# Patient Record
Sex: FEMALE | Race: WHITE | Employment: FULL TIME | URBAN - METROPOLITAN AREA
[De-identification: names, ages, dates, MRNs, and addresses within clinical notes are randomized per-mention and may not be internally consistent; named-entity substitution may affect disease eponyms.]

---

## 2017-01-09 ENCOUNTER — APPOINTMENT (OUTPATIENT)
Dept: HEMATOLOGY/ONCOLOGY | Age: 52
End: 2017-01-09
Attending: INTERNAL MEDICINE
Payer: COMMERCIAL

## 2017-01-11 ENCOUNTER — OFFICE VISIT (OUTPATIENT)
Dept: HEMATOLOGY/ONCOLOGY | Age: 52
End: 2017-01-11
Attending: INTERNAL MEDICINE
Payer: COMMERCIAL

## 2017-01-11 VITALS
DIASTOLIC BLOOD PRESSURE: 88 MMHG | HEART RATE: 97 BPM | TEMPERATURE: 99 F | RESPIRATION RATE: 18 BRPM | SYSTOLIC BLOOD PRESSURE: 133 MMHG

## 2017-01-11 DIAGNOSIS — L40.50 PSORIATIC ARTHROPATHY (HCC): Primary | ICD-10-CM

## 2017-01-11 PROCEDURE — 96413 CHEMO IV INFUSION 1 HR: CPT

## 2017-01-11 PROCEDURE — 96415 CHEMO IV INFUSION ADDL HR: CPT

## 2017-01-11 NOTE — PROGRESS NOTES
Education Record    Learner:  Patient    Disease / Diagnosis: psoriatic arthritis    Barriers / Limitations:  None   Comments:    Method:  Discussion   Comments:    General Topics:  Medication, Side effects and symptom management and Plan of care reviewed

## 2017-02-06 ENCOUNTER — OFFICE VISIT (OUTPATIENT)
Dept: HEMATOLOGY/ONCOLOGY | Age: 52
End: 2017-02-06
Attending: INTERNAL MEDICINE
Payer: COMMERCIAL

## 2017-02-06 VITALS
SYSTOLIC BLOOD PRESSURE: 131 MMHG | TEMPERATURE: 99 F | HEART RATE: 102 BPM | DIASTOLIC BLOOD PRESSURE: 81 MMHG | RESPIRATION RATE: 18 BRPM | BODY MASS INDEX: 40 KG/M2 | WEIGHT: 250 LBS

## 2017-02-06 DIAGNOSIS — L40.50 PSORIATIC ARTHROPATHY (HCC): Primary | ICD-10-CM

## 2017-02-06 PROCEDURE — 96413 CHEMO IV INFUSION 1 HR: CPT

## 2017-02-06 PROCEDURE — 96415 CHEMO IV INFUSION ADDL HR: CPT

## 2017-02-24 NOTE — TELEPHONE ENCOUNTER
From: Gordon Rahman  To: King Ni DO  Sent: 2/24/2017 11:01 AM CST  Subject: Prescription Question    Tariq Christie:  Would you be willing to send in temporary Rx for the Venlafaxine 75mg and 150mg strengths and Bupropion 300mg.  I am seeing you on March 6th

## 2017-02-24 NOTE — TELEPHONE ENCOUNTER
Pt is requesting bridge prescription to her appointment, pended if you agree    Future Appointments  Date Time Provider Dyan Gutiérrez   3/6/2017 2:00 PM PF TX RN1 PF CHEMO I Frank   3/6/2017 7:00 PM Robert Weinberg DO EMG 30 EMG [place]   3/16/2017 2

## 2017-03-06 ENCOUNTER — OFFICE VISIT (OUTPATIENT)
Dept: FAMILY MEDICINE CLINIC | Facility: CLINIC | Age: 52
End: 2017-03-06

## 2017-03-06 ENCOUNTER — OFFICE VISIT (OUTPATIENT)
Dept: HEMATOLOGY/ONCOLOGY | Age: 52
End: 2017-03-06
Attending: INTERNAL MEDICINE
Payer: COMMERCIAL

## 2017-03-06 VITALS
RESPIRATION RATE: 16 BRPM | SYSTOLIC BLOOD PRESSURE: 118 MMHG | BODY MASS INDEX: 40 KG/M2 | HEART RATE: 80 BPM | DIASTOLIC BLOOD PRESSURE: 78 MMHG | TEMPERATURE: 98 F | OXYGEN SATURATION: 98 % | WEIGHT: 249.38 LBS

## 2017-03-06 VITALS — WEIGHT: 247 LBS | BODY MASS INDEX: 40 KG/M2

## 2017-03-06 DIAGNOSIS — L40.50 PSORIATIC ARTHROPATHY (HCC): Primary | ICD-10-CM

## 2017-03-06 DIAGNOSIS — Z86.2 HISTORY OF ANEMIA: ICD-10-CM

## 2017-03-06 DIAGNOSIS — M54.50 BILATERAL LOW BACK PAIN WITHOUT SCIATICA, UNSPECIFIED CHRONICITY: ICD-10-CM

## 2017-03-06 DIAGNOSIS — E28.2 POLYCYSTIC OVARIES: ICD-10-CM

## 2017-03-06 DIAGNOSIS — R19.7 DIARRHEA, UNSPECIFIED TYPE: ICD-10-CM

## 2017-03-06 DIAGNOSIS — F41.9 ANXIETY: ICD-10-CM

## 2017-03-06 DIAGNOSIS — G47.00 INSOMNIA, UNSPECIFIED TYPE: ICD-10-CM

## 2017-03-06 DIAGNOSIS — I10 ESSENTIAL HYPERTENSION WITH GOAL BLOOD PRESSURE LESS THAN 140/90: ICD-10-CM

## 2017-03-06 DIAGNOSIS — K58.0 IRRITABLE BOWEL SYNDROME WITH DIARRHEA: Primary | ICD-10-CM

## 2017-03-06 DIAGNOSIS — K21.9 GASTROESOPHAGEAL REFLUX DISEASE WITHOUT ESOPHAGITIS: ICD-10-CM

## 2017-03-06 PROCEDURE — 99214 OFFICE O/P EST MOD 30 MIN: CPT | Performed by: FAMILY MEDICINE

## 2017-03-06 PROCEDURE — 96413 CHEMO IV INFUSION 1 HR: CPT

## 2017-03-06 PROCEDURE — 96415 CHEMO IV INFUSION ADDL HR: CPT

## 2017-03-06 RX ORDER — VENLAFAXINE HYDROCHLORIDE 150 MG/1
150 CAPSULE, EXTENDED RELEASE ORAL
Qty: 30 CAPSULE | Refills: 0 | Status: SHIPPED | OUTPATIENT
Start: 2017-03-06 | End: 2017-04-10

## 2017-03-06 RX ORDER — VENLAFAXINE HYDROCHLORIDE 75 MG/1
75 CAPSULE, EXTENDED RELEASE ORAL
Qty: 30 CAPSULE | Refills: 0 | Status: SHIPPED | OUTPATIENT
Start: 2017-03-06 | End: 2017-04-10

## 2017-03-06 RX ORDER — TRAZODONE HYDROCHLORIDE 50 MG/1
50 TABLET ORAL NIGHTLY PRN
Qty: 90 TABLET | Refills: 1 | Status: SHIPPED | OUTPATIENT
Start: 2017-03-06 | End: 2017-09-06

## 2017-03-06 RX ORDER — HYOSCYAMINE SULFATE 0.125 MG
0.12 TABLET ORAL EVERY 6 HOURS PRN
Qty: 40 TABLET | Refills: 3 | Status: SHIPPED | OUTPATIENT
Start: 2017-03-06 | End: 2017-04-05

## 2017-03-06 RX ORDER — PANTOPRAZOLE SODIUM 40 MG/1
40 TABLET, DELAYED RELEASE ORAL
Qty: 30 TABLET | Refills: 6 | Status: SHIPPED | OUTPATIENT
Start: 2017-03-06 | End: 2017-06-09

## 2017-03-06 RX ORDER — BACLOFEN 20 MG/1
TABLET ORAL
Qty: 30 TABLET | Refills: 1 | Status: SHIPPED | OUTPATIENT
Start: 2017-03-06 | End: 2017-04-10

## 2017-03-06 RX ORDER — BUPROPION HYDROCHLORIDE 300 MG/1
TABLET ORAL
Qty: 30 TABLET | Refills: 0 | Status: SHIPPED | OUTPATIENT
Start: 2017-03-06 | End: 2017-04-25

## 2017-03-06 RX ORDER — LISINOPRIL AND HYDROCHLOROTHIAZIDE 25; 20 MG/1; MG/1
1 TABLET ORAL
Qty: 90 TABLET | Refills: 1 | Status: SHIPPED | OUTPATIENT
Start: 2017-03-06 | End: 2017-06-09

## 2017-03-06 NOTE — PROGRESS NOTES
Education Record    Learner:  Patient    Disease / Diagnosis:ra    Barriers / Limitations:  None   Comments:    Method:  Reinforcement   Comments:    General Topics:  Side effects and symptom management and Plan of care reviewed   Comments:    Outcome:   Sh

## 2017-03-07 NOTE — PROGRESS NOTES
CHIEF COMPLAINT: Patient presents with:   Follow - Up: medication, bowel urgency         HPI:     Jessica Zepeda is a 45year old female presents for med refill and complains of diarrhea and bowel urgency at least 2 times a week increasing recently from [facility] lower extremities. Uses baclofen most nights to help with spasm and tightness in the back. Back pain is unchanged. Patient presents for recheck of hypertension.  Pt has been taking medications as instructed, no medication side effects, home BP monitori BuPROPion HCl ER, XL, 300 MG Oral Tablet 24 Hr Take 1 tablet (300 mg total) by mouth once daily.  Disp: 30 tablet Rfl: 0   baclofen 20 MG Oral Tab TAKE ONE TABLET BY MOUTH EVERY EVENING Disp: 30 tablet Rfl: 1   TraZODone HCl 50 MG Oral Tab Take 1 tablet ( Disp: 180 tablet Rfl: 1   [DISCONTINUED] Lisinopril-Hydrochlorothiazide 20-25 MG Oral Tab Take 1 tablet by mouth once daily.  Disp: 90 tablet Rfl: 1   [DISCONTINUED] TraZODone HCl 50 MG Oral Tab Take 1 tablet (50 mg total) by mouth nightly as needed [facility] Value: <0.1(mg/dL)* Date: 12/14/2016   Sed Rate Value: 18(mm/Hr)  Date: 12/14/2016   C-Reactive Protein Value: <0.29(mg/dL)  Date: 12/14/2016                                           WBC Value: 5.9(x10(3) uL)  Date: 12/14/2016   RBC Value: 4.49(x10 sciatica, unspecified chronicity  stable  - baclofen 20 MG Oral Tab; TAKE ONE TABLET BY MOUTH EVERY EVENING  Dispense: 30 tablet; Refill: 1    4. Insomnia, unspecified type  Sleep hygiene  - TraZODone HCl 50 MG Oral Tab;  Take 1 tablet (50 mg total) by mout 90 tablet 1      Sig: Take 1 tablet (50 mg total) by mouth nightly as needed for Sleep. Venlafaxine HCl ER 75 MG Oral Capsule SR 24 Hr 30 capsule 0      Sig: Take 1 capsule (75 mg total) by mouth once daily.       Venlafaxine HCl  MG Oral Capsule labs.

## 2017-03-07 NOTE — PATIENT INSTRUCTIONS
Please follow-up with:    Dr. Mesha Cheng MD  [facility]   [address]  [address], [address] [place] [address]  Phone: [phone]  Fax: [phone].     Back Acute back pain usually gets better in 1 to 2 weeks. Back pain related to disk disease, arthritis in the spinal joints or spinal stenosis (narrowing of the spinal canal) can become chronic and last for months or years.   Unless you had a physical injury (fo · Therapeutic massage can help relax the back muscles without stretching them.   · Be aware of safe lifting methods and do not lift anything without stretching first.  Medicines  Talk to your doctor before using medicine, especially if you have other medica People who have irritable bowel syndrome (IBS) have digestive tracts that react abnormally to certain substances or to stress. This leads to symptoms like cramps, gas, bloating, pain, constipation, and diarrhea.  Sometimes called “spastic colon,” IBS is a c Recommendations include:  · Certain medicines may help regulate the working of your digestive tract. Your healthcare provider may prescribe one or more for you. · Medicine can’t cure IBS, but it may help manage the symptoms.   · Because some medicines may If stress or anxiety makes your IBS symptoms worse, learning how to manage stress may help you feel better. Try these tips:  · Identify the causes of stress in your life. · Learn new ways to cope with them.   · Regular exercise is a great way to relieve st · Try cutting out dairy products. Sometimes this helps. · Try cutting out foods that are high in fat and fatty meats. · You can control bloating or passing excess gas.  Be careful with “gassy” vegetables and fruits like beans, cabbage, broccoli, and cauli · You have severe diarrhea  · You have blood (red or black color) or mucus in your stool  · You feel very weak or dizzy, faint, or have extreme thirst  · You have a fever of 100.4ºF (38[facility]) or higher, or as directed by your healthcare provider  Date Last

## 2017-03-08 ENCOUNTER — HOSPITAL ENCOUNTER (OUTPATIENT)
Dept: ULTRASOUND IMAGING | Facility: HOSPITAL | Age: 52
Discharge: HOME | End: 2017-03-08
Attending: INTERNAL MEDICINE
Payer: COMMERCIAL

## 2017-03-08 ENCOUNTER — APPOINTMENT (OUTPATIENT)
Dept: HEMATOLOGY/ONCOLOGY | Age: 52
End: 2017-03-08
Attending: INTERNAL MEDICINE
Payer: COMMERCIAL

## 2017-03-08 DIAGNOSIS — Z79.899 LONG-TERM USE OF IMMUNOSUPPRESSANT MEDICATION: ICD-10-CM

## 2017-03-08 DIAGNOSIS — L40.50 ARTHRITIS WITH PSORIASIS (HCC): ICD-10-CM

## 2017-03-08 DIAGNOSIS — L40.50 PSORIATIC ARTHRITIS (HCC): ICD-10-CM

## 2017-03-08 DIAGNOSIS — M79.89 SWELLING OF THUMB, LEFT: ICD-10-CM

## 2017-03-08 PROCEDURE — 85025 COMPLETE CBC W/AUTO DIFF WBC: CPT

## 2017-03-08 PROCEDURE — 84439 ASSAY OF FREE THYROXINE: CPT

## 2017-03-08 PROCEDURE — 36415 COLL VENOUS BLD VENIPUNCTURE: CPT

## 2017-03-08 PROCEDURE — 86140 C-REACTIVE PROTEIN: CPT

## 2017-03-08 PROCEDURE — 76881 US COMPL JOINT R-T W/IMG: CPT

## 2017-03-08 PROCEDURE — 80053 COMPREHEN METABOLIC PANEL: CPT

## 2017-03-08 PROCEDURE — 85652 RBC SED RATE AUTOMATED: CPT

## 2017-03-08 PROCEDURE — 84443 ASSAY THYROID STIM HORMONE: CPT

## 2017-03-08 PROCEDURE — 86480 TB TEST CELL IMMUN MEASURE: CPT

## 2017-03-16 ENCOUNTER — TELEPHONE (OUTPATIENT)
Dept: FAMILY MEDICINE CLINIC | Facility: CLINIC | Age: 52
End: 2017-03-16

## 2017-03-16 DIAGNOSIS — E89.0 POST-SURGICAL HYPOTHYROIDISM: ICD-10-CM

## 2017-03-16 DIAGNOSIS — E03.9 ACQUIRED HYPOTHYROIDISM: Primary | ICD-10-CM

## 2017-03-16 RX ORDER — LEVOTHYROXINE SODIUM 112 UG/1
112 CAPSULE ORAL
Qty: 30 CAPSULE | Refills: 2 | Status: SHIPPED | OUTPATIENT
Start: 2017-03-16 | End: 2017-04-19

## 2017-03-16 NOTE — TELEPHONE ENCOUNTER
Patient with hypothyroidism and has hyperthyroidism having recent IBS symptoms and diarrhea and it could be related to her excessive thyroid medication.  New RX faxed please inform

## 2017-03-16 NOTE — TELEPHONE ENCOUNTER
Left message for pt with lab results. Office # and hours given for any questions      Notes Recorded by Mary Bass DO on 3/15/2017 at 6:50 PM  iatrogenic hyperthyroidism- hx of thyroid cancer/s/p thyroidectomy on levothyroxine 125mcg.   Will forward th

## 2017-04-03 ENCOUNTER — APPOINTMENT (OUTPATIENT)
Dept: HEMATOLOGY/ONCOLOGY | Age: 52
End: 2017-04-03
Attending: INTERNAL MEDICINE
Payer: COMMERCIAL

## 2017-04-03 ENCOUNTER — OFFICE VISIT (OUTPATIENT)
Dept: HEMATOLOGY/ONCOLOGY | Age: 52
End: 2017-04-03
Attending: INTERNAL MEDICINE
Payer: COMMERCIAL

## 2017-04-03 VITALS
HEIGHT: 65.98 IN | BODY MASS INDEX: 39.7 KG/M2 | TEMPERATURE: 98 F | WEIGHT: 247 LBS | SYSTOLIC BLOOD PRESSURE: 117 MMHG | DIASTOLIC BLOOD PRESSURE: 74 MMHG | RESPIRATION RATE: 16 BRPM | HEART RATE: 80 BPM

## 2017-04-03 DIAGNOSIS — L40.50 PSORIATIC ARTHROPATHY (HCC): Primary | ICD-10-CM

## 2017-04-03 PROCEDURE — 96415 CHEMO IV INFUSION ADDL HR: CPT

## 2017-04-03 PROCEDURE — 96413 CHEMO IV INFUSION 1 HR: CPT

## 2017-04-03 NOTE — PROGRESS NOTES
Education Record    Learner:  Patient    Disease / [place] arthritis    Barriers / Limitations:  None   Comments:    Method:  Discussion   Comments:    General Topics:  Medication, Side effects and symptom management and Plan of care reviewed

## 2017-04-10 ENCOUNTER — OFFICE VISIT (OUTPATIENT)
Dept: FAMILY MEDICINE CLINIC | Facility: CLINIC | Age: 52
End: 2017-04-10

## 2017-04-10 VITALS
RESPIRATION RATE: 16 BRPM | BODY MASS INDEX: 40 KG/M2 | OXYGEN SATURATION: 98 % | WEIGHT: 249 LBS | SYSTOLIC BLOOD PRESSURE: 122 MMHG | HEART RATE: 89 BPM | TEMPERATURE: 98 F | DIASTOLIC BLOOD PRESSURE: 76 MMHG

## 2017-04-10 DIAGNOSIS — M54.50 BILATERAL LOW BACK PAIN WITHOUT SCIATICA, UNSPECIFIED CHRONICITY: ICD-10-CM

## 2017-04-10 DIAGNOSIS — R10.9 ABDOMINAL CRAMPING: ICD-10-CM

## 2017-04-10 DIAGNOSIS — K58.0 IRRITABLE BOWEL SYNDROME WITH DIARRHEA: ICD-10-CM

## 2017-04-10 DIAGNOSIS — F33.42 RECURRENT MAJOR DEPRESSIVE DISORDER, IN FULL REMISSION (HCC): ICD-10-CM

## 2017-04-10 DIAGNOSIS — R11.15 INTRACTABLE CYCLICAL VOMITING WITH NAUSEA: Primary | ICD-10-CM

## 2017-04-10 DIAGNOSIS — R13.10 DYSPHAGIA, UNSPECIFIED(787.20): ICD-10-CM

## 2017-04-10 DIAGNOSIS — F41.9 ANXIETY: ICD-10-CM

## 2017-04-10 DIAGNOSIS — E89.0 POSTOPERATIVE HYPOTHYROIDISM: ICD-10-CM

## 2017-04-10 PROCEDURE — 99214 OFFICE O/P EST MOD 30 MIN: CPT | Performed by: FAMILY MEDICINE

## 2017-04-10 RX ORDER — BACLOFEN 20 MG/1
TABLET ORAL
Qty: 90 TABLET | Refills: 1 | Status: SHIPPED | OUTPATIENT
Start: 2017-04-10 | End: 2017-07-21

## 2017-04-10 RX ORDER — VENLAFAXINE HYDROCHLORIDE 150 MG/1
150 CAPSULE, EXTENDED RELEASE ORAL
Qty: 90 CAPSULE | Refills: 0 | Status: SHIPPED | OUTPATIENT
Start: 2017-04-10 | End: 2017-07-21

## 2017-04-10 RX ORDER — VENLAFAXINE HYDROCHLORIDE 75 MG/1
75 CAPSULE, EXTENDED RELEASE ORAL
Qty: 90 CAPSULE | Refills: 0 | Status: SHIPPED | OUTPATIENT
Start: 2017-04-10 | End: 2017-07-21

## 2017-04-11 NOTE — PROGRESS NOTES
CHIEF COMPLAINT: Patient presents with:   Follow - Up: labs        HPI:     Ernesta Fleischer is a 45year old female presents for labs/thyroid and followup of diarrhea and bowel urgency at least 2 times a week increasing recently from occasional in the past ye pregnancy 1998, 2003     x2   • Psoriatic arthritis ([facility] [address]    • Thyroid cancer [facility]) 1994     thyroidectomy          Past Surgical History    KNEE REPLACEMENT SURGERY      ORAL SURGERY PROCEDURE  1/1/1989    Comment Tooth Extraction, \" wisdom teeth x 4\" tablet Rfl: 1   MetFORMIN HCl 850 MG Oral Tab TAKE ONE TABLET BY MOUTH TWICE DAILY WITH MEALS Disp: 180 tablet Rfl: 1   BuPROPion HCl ER, XL, 300 MG Oral Tablet 24 Hr Take 1 tablet (300 mg total) by mouth once daily.  Disp: 30 tablet Rfl: 0   TraZODone HCl 03/30/2017 (Exact Date)  Vital signs reviewed. Appears stated age, well groomed. Physical Exam  General: Well-nourished, well hydrated. No acute distress. No pallor. No tachypnea. Non toxic. HEENT: normocephaly, atraumatic.   Sclera clear and non icteric b Refill: 0  - Venlafaxine HCl  MG Oral Capsule SR 24 Hr; Take 1 capsule (150 mg total) by mouth once daily. Dispense: 90 capsule; Refill: 0    6. Intractable cyclical vomiting with nausea  5.  Dysphagia, unspecified  Etiology unclear   Uncontrolled si Esophageal reflux     Abnormal echocardiogram     SOB (shortness of breath)     Vegetarian diet     Hallux valgus, acquired, bilateral     Psoriasis of nail     Metatarsalgia of both feet     Irritable bowel syndrome with diarrhea     History of anemia

## 2017-04-17 ENCOUNTER — NURSE ONLY (OUTPATIENT)
Dept: HEMATOLOGY/ONCOLOGY | Age: 52
End: 2017-04-17
Attending: INTERNAL MEDICINE
Payer: COMMERCIAL

## 2017-04-17 DIAGNOSIS — M25.676: ICD-10-CM

## 2017-04-17 DIAGNOSIS — R20.2 TINGLING: ICD-10-CM

## 2017-04-17 DIAGNOSIS — L40.50 PSORIATIC ARTHRITIS (HCC): ICD-10-CM

## 2017-04-17 DIAGNOSIS — Z79.899 LONG-TERM USE OF IMMUNOSUPPRESSANT MEDICATION: ICD-10-CM

## 2017-04-17 DIAGNOSIS — E89.0 POST-SURGICAL HYPOTHYROIDISM: ICD-10-CM

## 2017-04-17 DIAGNOSIS — R74.8 ALKALINE PHOSPHATASE ELEVATION: ICD-10-CM

## 2017-04-17 PROCEDURE — 84439 ASSAY OF FREE THYROXINE: CPT

## 2017-04-17 PROCEDURE — 84443 ASSAY THYROID STIM HORMONE: CPT

## 2017-04-17 PROCEDURE — 85652 RBC SED RATE AUTOMATED: CPT

## 2017-04-17 PROCEDURE — 82565 ASSAY OF CREATININE: CPT

## 2017-04-17 PROCEDURE — 84080 ASSAY ALKALINE PHOSPHATASES: CPT

## 2017-04-17 PROCEDURE — 82977 ASSAY OF GGT: CPT

## 2017-04-17 PROCEDURE — 86140 C-REACTIVE PROTEIN: CPT

## 2017-04-17 PROCEDURE — 85025 COMPLETE CBC W/AUTO DIFF WBC: CPT

## 2017-04-17 PROCEDURE — 84520 ASSAY OF UREA NITROGEN: CPT

## 2017-04-17 PROCEDURE — 80076 HEPATIC FUNCTION PANEL: CPT

## 2017-04-17 PROCEDURE — 36415 COLL VENOUS BLD VENIPUNCTURE: CPT

## 2017-04-18 ENCOUNTER — PATIENT MESSAGE (OUTPATIENT)
Dept: FAMILY MEDICINE CLINIC | Facility: CLINIC | Age: 52
End: 2017-04-18

## 2017-04-18 DIAGNOSIS — E89.0 POST-SURGICAL HYPOTHYROIDISM: Primary | ICD-10-CM

## 2017-04-19 RX ORDER — LEVOTHYROXINE SODIUM 112 UG/1
112 CAPSULE ORAL
Qty: 90 CAPSULE | Refills: 0 | Status: SHIPPED | OUTPATIENT
Start: 2017-04-19 | End: 2017-05-19

## 2017-04-19 NOTE — TELEPHONE ENCOUNTER
From: Gaurav Shearer  To: Trupti Bright DO  Sent: 4/18/2017 11:10 AM CDT  Subject: Test Results Question    My TSH/T4 labs are completed.  Please check w/ MD what dose levothyroxine she wants me on and send to [address] [place] [address] supply if po

## 2017-04-19 NOTE — TELEPHONE ENCOUNTER
Pt requesting refill on levothyroxine, protocol passed, refill approved    LOV 4/10/17    LF 3/16/17 #30 w/2    Future Appointments  Date Time Provider Dyan Gutiérrez   6/5/2017 9:20 AM DO NADIR Baugh NPV   10/16/2017 6:20 PM [facility]

## 2017-04-24 NOTE — TELEPHONE ENCOUNTER
Pt requesting refill on Buproprion, no protocol, unable to approve    LOV 4/10/17    LF 3/6/17 #30    Future Appointments  Date Time Provider Dyan Gutiérrez   5/2/2017 4:45 PM Grace Kuo DPM [address]   6/5/2017 9:20 AM Dante Pettit,

## 2017-04-24 NOTE — TELEPHONE ENCOUNTER
From: Aleksandra Rodríguez  To:  Mayelin Valentin DO  Sent: 4/21/2017 5:12 PM CDT  Subject: Medication Renewal Request    Original authorizing provider: DO Augustina Booth would like a refill of the following medications:  BuPROPion HCl ER, XL, 300

## 2017-04-25 RX ORDER — BUPROPION HYDROCHLORIDE 300 MG/1
TABLET ORAL
Qty: 90 TABLET | Refills: 1 | Status: SHIPPED | OUTPATIENT
Start: 2017-04-25 | End: 2017-07-21

## 2017-05-01 ENCOUNTER — OFFICE VISIT (OUTPATIENT)
Dept: HEMATOLOGY/ONCOLOGY | Age: 52
End: 2017-05-01
Attending: INTERNAL MEDICINE
Payer: COMMERCIAL

## 2017-05-01 VITALS — WEIGHT: 247 LBS | BODY MASS INDEX: 40 KG/M2

## 2017-05-01 DIAGNOSIS — L40.50 PSORIATIC ARTHROPATHY (HCC): Primary | ICD-10-CM

## 2017-05-01 PROCEDURE — 96413 CHEMO IV INFUSION 1 HR: CPT

## 2017-05-01 PROCEDURE — 96415 CHEMO IV INFUSION ADDL HR: CPT

## 2017-05-01 NOTE — PROGRESS NOTES
Education Record    Learner:  Patient    Disease / [address] arthritis    Barriers / Limitations:  None   Comments:    Method:  Reinforcement   Comments:    General Topics:  Side effects and symptom management and Plan of care reviewed   Comments

## 2017-05-22 ENCOUNTER — PATIENT MESSAGE (OUTPATIENT)
Dept: FAMILY MEDICINE CLINIC | Facility: CLINIC | Age: 52
End: 2017-05-22

## 2017-05-22 NOTE — TELEPHONE ENCOUNTER
From: Suzanne Stoner  To: Christos Cohn DO  Sent: 5/22/2017 5:25 PM CDT  Subject: Other    Chelsea Becket:  Good news! ! I figured out the mystery. My GI issues were most probably caused by 5-HTP. Sherin Wallace got me a bottle from his work.  I ran out and the issues are much

## 2017-05-31 ENCOUNTER — OFFICE VISIT (OUTPATIENT)
Dept: HEMATOLOGY/ONCOLOGY | Age: 52
End: 2017-05-31
Attending: INTERNAL MEDICINE
Payer: COMMERCIAL

## 2017-05-31 DIAGNOSIS — L40.50 PSORIATIC ARTHROPATHY (HCC): Primary | ICD-10-CM

## 2017-05-31 PROCEDURE — 96365 THER/PROPH/DIAG IV INF INIT: CPT

## 2017-05-31 PROCEDURE — 96415 CHEMO IV INFUSION ADDL HR: CPT

## 2017-05-31 PROCEDURE — 96366 THER/PROPH/DIAG IV INF ADDON: CPT

## 2017-05-31 PROCEDURE — 96413 CHEMO IV INFUSION 1 HR: CPT

## 2017-05-31 NOTE — PROGRESS NOTES
Instructed pt to have Dr. Deanne Degroot office to fax new Remicade order after Monday's MD f/u appt. Pt verbalized understanding.

## 2017-06-07 ENCOUNTER — PATIENT MESSAGE (OUTPATIENT)
Dept: FAMILY MEDICINE CLINIC | Facility: CLINIC | Age: 52
End: 2017-06-07

## 2017-06-07 RX ORDER — ALPRAZOLAM 0.25 MG/1
TABLET ORAL
Qty: 30 TABLET | Refills: 0 | Status: SHIPPED | OUTPATIENT
Start: 2017-06-07 | End: 2017-09-17

## 2017-06-07 NOTE — TELEPHONE ENCOUNTER
Please call in.order signed. RX alprazolam given 1 tab every 6 hours as needed. #30. Please inform. Sent email to pt.

## 2017-06-07 NOTE — TELEPHONE ENCOUNTER
From: Dede Rinne  To: Gem Garcia DO  Sent: 6/7/2017 11:39 AM CDT  Subject: Prescription Question    Hi Ria:  I wonder if you would be willing to write an Rx for alprazolam for me to have on hand.  My dad passed away this past Friday - he was suffer

## 2017-06-08 NOTE — TELEPHONE ENCOUNTER
Pt returned call, requested RX sent to [facility].  Rx called to pharmacy, [address]

## 2017-06-09 RX ORDER — LEVOTHYROXINE SODIUM 0.12 MG/1
TABLET ORAL
Qty: 90 TABLET | OUTPATIENT
Start: 2017-06-09

## 2017-06-09 RX ORDER — LISINOPRIL AND HYDROCHLOROTHIAZIDE 25; 20 MG/1; MG/1
TABLET ORAL
Qty: 90 TABLET | Refills: 0 | Status: SHIPPED | OUTPATIENT
Start: 2017-06-09 | End: 2017-10-16

## 2017-06-09 NOTE — TELEPHONE ENCOUNTER
Refill request for lisinopril. Protocol passed, refill approved. Refill for pantoprazole. No protocol, unable to approve. LOV 4/10/2017   3/6/2017 #30 w/ 6. Please advise.

## 2017-06-12 RX ORDER — LEVOTHYROXINE SODIUM 0.12 MG/1
TABLET ORAL
Qty: 90 TABLET | OUTPATIENT
Start: 2017-06-12

## 2017-06-12 RX ORDER — PANTOPRAZOLE SODIUM 40 MG/1
TABLET, DELAYED RELEASE ORAL
Qty: 30 TABLET | OUTPATIENT
Start: 2017-06-12

## 2017-06-12 RX ORDER — PANTOPRAZOLE SODIUM 40 MG/1
TABLET, DELAYED RELEASE ORAL
Qty: 30 TABLET | Refills: 3 | Status: SHIPPED | OUTPATIENT
Start: 2017-06-12 | End: 2017-09-06

## 2017-06-28 ENCOUNTER — OFFICE VISIT (OUTPATIENT)
Dept: HEMATOLOGY/ONCOLOGY | Age: 52
End: 2017-06-28
Attending: INTERNAL MEDICINE
Payer: COMMERCIAL

## 2017-06-28 VITALS
TEMPERATURE: 98 F | BODY MASS INDEX: 39 KG/M2 | SYSTOLIC BLOOD PRESSURE: 128 MMHG | WEIGHT: 244 LBS | DIASTOLIC BLOOD PRESSURE: 83 MMHG | HEART RATE: 90 BPM | RESPIRATION RATE: 16 BRPM

## 2017-06-28 DIAGNOSIS — L40.50 PSORIATIC ARTHROPATHY (HCC): Primary | ICD-10-CM

## 2017-06-28 PROCEDURE — 96413 CHEMO IV INFUSION 1 HR: CPT

## 2017-06-28 PROCEDURE — 96415 CHEMO IV INFUSION ADDL HR: CPT

## 2017-06-28 NOTE — PROGRESS NOTES
Education Record    Learner:  Patient    Disease / Diagnosis:arthritis    Barriers / Limitations:  None    Method:  Brief focused, printed material and  reinforcement    General Topics:  Plan of care reviewed    Outcome:  Shows understanding

## 2017-07-18 ENCOUNTER — NURSE ONLY (OUTPATIENT)
Dept: HEMATOLOGY/ONCOLOGY | Age: 52
End: 2017-07-18
Attending: INTERNAL MEDICINE
Payer: COMMERCIAL

## 2017-07-18 DIAGNOSIS — Z79.899 LONG-TERM USE OF HIGH-RISK MEDICATION: ICD-10-CM

## 2017-07-18 LAB
ALBUMIN SERPL-MCNC: 3.7 G/DL (ref 3.5–4.8)
ALP LIVER SERPL-CCNC: 115 U/L (ref 41–108)
ALT SERPL-CCNC: 23 U/L (ref 14–54)
AST SERPL-CCNC: 18 U/L (ref 15–41)
BASOPHILS # BLD AUTO: 0.1 X10(3) UL (ref 0–0.1)
BASOPHILS NFR BLD AUTO: 1.5 %
BILIRUB DIRECT SERPL-MCNC: <0.1 MG/DL (ref 0.1–0.5)
BILIRUB SERPL-MCNC: 0.3 MG/DL (ref 0.1–2)
BUN BLD-MCNC: 7 MG/DL (ref 8–20)
C-REACTIVE PROTEIN: <0.29 MG/DL (ref ?–1)
CREAT BLD-MCNC: 0.81 MG/DL (ref 0.55–1.02)
EOSINOPHIL # BLD AUTO: 0.13 X10(3) UL (ref 0–0.3)
EOSINOPHIL NFR BLD AUTO: 2 %
ERYTHROCYTE [DISTWIDTH] IN BLOOD BY AUTOMATED COUNT: 16.1 % (ref 11.5–16)
HCT VFR BLD AUTO: 37.8 % (ref 34–50)
HGB BLD-MCNC: 12.2 G/DL (ref 12–16)
IMMATURE GRANULOCYTE COUNT: 0.03 X10(3) UL (ref 0–1)
IMMATURE GRANULOCYTE RATIO %: 0.5 %
LYMPHOCYTES # BLD AUTO: 2.14 X10(3) UL (ref 0.9–4)
LYMPHOCYTES NFR BLD AUTO: 32.2 %
M PROTEIN MFR SERPL ELPH: 7 G/DL (ref 6.1–8.3)
MCH RBC QN AUTO: 29.4 PG (ref 27–33.2)
MCHC RBC AUTO-ENTMCNC: 32.3 G/DL (ref 31–37)
MCV RBC AUTO: 91.1 FL (ref 81–100)
MONOCYTES # BLD AUTO: 0.66 X10(3) UL (ref 0.1–0.6)
MONOCYTES NFR BLD AUTO: 9.9 %
NEUTROPHIL ABS PRELIM: 3.59 X10 (3) UL (ref 1.3–6.7)
NEUTROPHILS # BLD AUTO: 3.59 X10(3) UL (ref 1.3–6.7)
NEUTROPHILS NFR BLD AUTO: 53.9 %
PLATELET # BLD AUTO: 253 10(3)UL (ref 150–450)
RBC # BLD AUTO: 4.15 X10(6)UL (ref 3.8–5.1)
RED CELL DISTRIBUTION WIDTH-SD: 52.2 FL (ref 35.1–46.3)
SED RATE-ML: 15 MM/HR (ref 0–25)
WBC # BLD AUTO: 6.7 X10(3) UL (ref 4–13)

## 2017-07-18 PROCEDURE — 84520 ASSAY OF UREA NITROGEN: CPT

## 2017-07-18 PROCEDURE — 86140 C-REACTIVE PROTEIN: CPT

## 2017-07-18 PROCEDURE — 85025 COMPLETE CBC W/AUTO DIFF WBC: CPT

## 2017-07-18 PROCEDURE — 85652 RBC SED RATE AUTOMATED: CPT

## 2017-07-18 PROCEDURE — 80076 HEPATIC FUNCTION PANEL: CPT

## 2017-07-18 PROCEDURE — 36415 COLL VENOUS BLD VENIPUNCTURE: CPT

## 2017-07-18 PROCEDURE — 82565 ASSAY OF CREATININE: CPT

## 2017-07-21 DIAGNOSIS — E28.2 POLYCYSTIC OVARIES: ICD-10-CM

## 2017-07-21 DIAGNOSIS — F41.9 ANXIETY: ICD-10-CM

## 2017-07-21 DIAGNOSIS — F33.42 RECURRENT MAJOR DEPRESSIVE DISORDER, IN FULL REMISSION (HCC): ICD-10-CM

## 2017-07-21 DIAGNOSIS — M54.50 BILATERAL LOW BACK PAIN WITHOUT SCIATICA, UNSPECIFIED CHRONICITY: ICD-10-CM

## 2017-07-24 RX ORDER — VENLAFAXINE HYDROCHLORIDE 150 MG/1
150 CAPSULE, EXTENDED RELEASE ORAL
Qty: 90 CAPSULE | Refills: 0 | Status: SHIPPED | OUTPATIENT
Start: 2017-07-24 | End: 2017-10-24

## 2017-07-24 RX ORDER — VENLAFAXINE HYDROCHLORIDE 75 MG/1
75 CAPSULE, EXTENDED RELEASE ORAL
Qty: 90 CAPSULE | Refills: 0 | Status: SHIPPED | OUTPATIENT
Start: 2017-07-24 | End: 2017-10-16

## 2017-07-24 RX ORDER — BACLOFEN 20 MG/1
TABLET ORAL
Qty: 90 TABLET | Refills: 0 | Status: SHIPPED | OUTPATIENT
Start: 2017-07-24 | End: 2017-10-16

## 2017-07-24 RX ORDER — BUPROPION HYDROCHLORIDE 300 MG/1
TABLET ORAL
Qty: 90 TABLET | Refills: 0 | Status: SHIPPED | OUTPATIENT
Start: 2017-07-24 | End: 2017-10-16

## 2017-07-24 NOTE — TELEPHONE ENCOUNTER
Pt requesting refills on multiple medications, no protocols, unable to approve    LOV 4/10/17    LF 4/10/17    Future Appointments  Date Time Provider Dyan Gutiérrez   7/26/2017 2:00 PM [address]   10/16/2017 6:20 PM Josy Suárez

## 2017-07-26 ENCOUNTER — OFFICE VISIT (OUTPATIENT)
Dept: HEMATOLOGY/ONCOLOGY | Age: 52
End: 2017-07-26
Attending: INTERNAL MEDICINE
Payer: COMMERCIAL

## 2017-07-26 VITALS
DIASTOLIC BLOOD PRESSURE: 84 MMHG | RESPIRATION RATE: 16 BRPM | TEMPERATURE: 99 F | HEART RATE: 99 BPM | SYSTOLIC BLOOD PRESSURE: 129 MMHG

## 2017-07-26 DIAGNOSIS — L40.50 PSORIATIC ARTHROPATHY (HCC): Primary | ICD-10-CM

## 2017-07-26 PROCEDURE — 96413 CHEMO IV INFUSION 1 HR: CPT

## 2017-07-26 PROCEDURE — 96415 CHEMO IV INFUSION ADDL HR: CPT

## 2017-08-23 ENCOUNTER — OFFICE VISIT (OUTPATIENT)
Dept: HEMATOLOGY/ONCOLOGY | Age: 52
End: 2017-08-23
Attending: INTERNAL MEDICINE
Payer: COMMERCIAL

## 2017-08-23 DIAGNOSIS — L40.50 PSORIATIC ARTHROPATHY (HCC): Primary | ICD-10-CM

## 2017-08-23 PROCEDURE — 96415 CHEMO IV INFUSION ADDL HR: CPT

## 2017-08-23 PROCEDURE — 96413 CHEMO IV INFUSION 1 HR: CPT

## 2017-09-06 DIAGNOSIS — G47.00 INSOMNIA, UNSPECIFIED TYPE: ICD-10-CM

## 2017-09-06 DIAGNOSIS — E89.0 POST-SURGICAL HYPOTHYROIDISM: ICD-10-CM

## 2017-09-07 RX ORDER — TRAZODONE HYDROCHLORIDE 50 MG/1
50 TABLET ORAL NIGHTLY PRN
Qty: 90 TABLET | Refills: 1 | Status: SHIPPED | OUTPATIENT
Start: 2017-09-07 | End: 2017-10-16

## 2017-09-07 RX ORDER — LEVOTHYROXINE SODIUM 112 UG/1
112 TABLET ORAL
Qty: 30 TABLET | OUTPATIENT
Start: 2017-09-07 | End: 2017-10-07

## 2017-09-07 RX ORDER — PANTOPRAZOLE SODIUM 40 MG/1
TABLET, DELAYED RELEASE ORAL
Qty: 30 TABLET | Refills: 1 | Status: SHIPPED | OUTPATIENT
Start: 2017-09-07 | End: 2017-10-16

## 2017-09-07 NOTE — TELEPHONE ENCOUNTER
Refill request levothyroxine, med filled on 9/6/2017. Refill request for trazodone, no protocol, unable to approve. LOV 4/10/2017.  6/9/2017 w/ 1 refill. Refill request for pantoprazole. No protocol, unable to approve. LOV 4/10/2017  6/12/2017 #30 w/ 3.

## 2017-09-13 ENCOUNTER — TELEPHONE (OUTPATIENT)
Dept: FAMILY MEDICINE CLINIC | Facility: CLINIC | Age: 52
End: 2017-09-13

## 2017-09-13 NOTE — TELEPHONE ENCOUNTER
[employer] Nurse  called asking to speak with the nurse for some medical clarification. Transferred to triage line.   (She states she will send a request via fax as well.)

## 2017-09-14 ENCOUNTER — TELEPHONE (OUTPATIENT)
Dept: FAMILY MEDICINE CLINIC | Facility: CLINIC | Age: 52
End: 2017-09-14

## 2017-09-14 NOTE — TELEPHONE ENCOUNTER
Nathan Mahajan is calling from [facility] as patient's , she is seeking a way to make patient's care more cost effective.  She is requesting the office faxes [employer] patient's med list, physicians she regularly sees, her treatment plan, and how compliant sh

## 2017-09-14 NOTE — TELEPHONE ENCOUNTER
Left message for patient, would like to receive verbal consent from patient before providing requested information to [employer].      Future Appointments  Date Time Provider Dyan Gutiérrez   9/20/2017 2:00 PM DREA ARMAS RN3 PF CHEMO ANN Marie   10/16/2017

## 2017-09-17 ENCOUNTER — OFFICE VISIT (OUTPATIENT)
Dept: FAMILY MEDICINE CLINIC | Facility: CLINIC | Age: 52
End: 2017-09-17

## 2017-09-17 VITALS
HEART RATE: 91 BPM | HEIGHT: 66 IN | DIASTOLIC BLOOD PRESSURE: 80 MMHG | SYSTOLIC BLOOD PRESSURE: 120 MMHG | WEIGHT: 246 LBS | OXYGEN SATURATION: 98 % | TEMPERATURE: 99 F | BODY MASS INDEX: 39.53 KG/M2 | RESPIRATION RATE: 18 BRPM

## 2017-09-17 DIAGNOSIS — J06.9 ACUTE URI: Primary | ICD-10-CM

## 2017-09-17 PROCEDURE — 99213 OFFICE O/P EST LOW 20 MIN: CPT | Performed by: NURSE PRACTITIONER

## 2017-09-17 RX ORDER — BENZONATATE 200 MG/1
200 CAPSULE ORAL 3 TIMES DAILY PRN
Qty: 20 CAPSULE | Refills: 0 | Status: SHIPPED | OUTPATIENT
Start: 2017-09-17 | End: 2017-09-24

## 2017-09-17 RX ORDER — CODEINE PHOSPHATE AND GUAIFENESIN 10; 100 MG/5ML; MG/5ML
SOLUTION ORAL
Qty: 180 ML | Refills: 0 | Status: SHIPPED | OUTPATIENT
Start: 2017-09-17 | End: 2017-10-16 | Stop reason: ALTCHOICE

## 2017-09-17 RX ORDER — AZITHROMYCIN 250 MG/1
TABLET, FILM COATED ORAL
Qty: 6 TABLET | Refills: 0 | Status: SHIPPED | OUTPATIENT
Start: 2017-09-17 | End: 2017-10-16 | Stop reason: ALTCHOICE

## 2017-09-17 NOTE — PROGRESS NOTES
CHIEF COMPLAINT:   Patient presents with:  Chest Congestion: s/s for 6 days  Cough      HPI:   Karie Lucero is a 47year old female who presents for upper respiratory symptoms for  6 days.  Patient reports cough with yellow colored sputum, cough is keepi FOLIC ACID 1 MG Oral Tab One tab per day Disp: 90 tablet Rfl: 1   LISINOPRIL-HYDROCHLOROTHIAZIDE 20-25 MG Oral Tab Take 1 tablet by mouth once daily.  Disp: 90 tablet Rfl: 0   Levothyroxine Sodium 112 MCG Oral Tab TAKE ONE TABLET BY MOUTH EVERY DAY BEFORE B /80   Pulse 91   Temp 98.5 °F (36.9 °C) (Oral)   Resp 18   Ht 66\"   Wt 246 lb   LMP 09/10/2017 (Exact Date)   SpO2 98%   Breastfeeding?  No   BMI 39.71 kg/m²   GENERAL: well developed, well nourished, and in no apparent distress, afebrile  SKIN: no r Bronchitis is an infection of the air passages (bronchial tubes) in your lungs. It often occurs when you have a cold.  This illness is contagious during the first few days and is spread through the air by coughing and sneezing, or by direct contact (touchin Follow up with your healthcare provider, or as advised. If you had an X-ray or ECG (electrocardiogram), a specialist will review it. You will be notified of any new findings that may affect your care.   Note: If you are age 72 or older, or if you have a chr

## 2017-09-20 ENCOUNTER — APPOINTMENT (OUTPATIENT)
Dept: HEMATOLOGY/ONCOLOGY | Age: 52
End: 2017-09-20
Attending: INTERNAL MEDICINE
Payer: COMMERCIAL

## 2017-09-22 ENCOUNTER — NURSE ONLY (OUTPATIENT)
Dept: HEMATOLOGY/ONCOLOGY | Age: 52
End: 2017-09-22
Attending: FAMILY MEDICINE
Payer: COMMERCIAL

## 2017-09-22 DIAGNOSIS — L40.50 PSORIATIC ARTHRITIS (HCC): ICD-10-CM

## 2017-09-22 DIAGNOSIS — M77.9 ENTHESITIS: ICD-10-CM

## 2017-09-22 DIAGNOSIS — Z79.899 LONG-TERM USE OF IMMUNOSUPPRESSANT MEDICATION: ICD-10-CM

## 2017-09-22 DIAGNOSIS — M25.676: ICD-10-CM

## 2017-09-22 LAB
ALBUMIN SERPL-MCNC: 3.7 G/DL (ref 3.5–4.8)
ALP LIVER SERPL-CCNC: 124 U/L (ref 41–108)
ALT SERPL-CCNC: 32 U/L (ref 14–54)
AST SERPL-CCNC: 17 U/L (ref 15–41)
BASOPHILS # BLD AUTO: 0.04 X10(3) UL (ref 0–0.1)
BASOPHILS NFR BLD AUTO: 0.7 %
BILIRUB DIRECT SERPL-MCNC: 0.1 MG/DL (ref 0.1–0.5)
BILIRUB SERPL-MCNC: 0.5 MG/DL (ref 0.1–2)
BUN BLD-MCNC: 9 MG/DL (ref 8–20)
C-REACTIVE PROTEIN: <0.29 MG/DL (ref ?–1)
CREAT BLD-MCNC: 0.81 MG/DL (ref 0.55–1.02)
EOSINOPHIL # BLD AUTO: 0.12 X10(3) UL (ref 0–0.3)
EOSINOPHIL NFR BLD AUTO: 2 %
ERYTHROCYTE [DISTWIDTH] IN BLOOD BY AUTOMATED COUNT: 17.5 % (ref 11.5–16)
HCT VFR BLD AUTO: 40.6 % (ref 34–50)
HGB BLD-MCNC: 13.1 G/DL (ref 12–16)
IMMATURE GRANULOCYTE COUNT: 0.03 X10(3) UL (ref 0–1)
IMMATURE GRANULOCYTE RATIO %: 0.5 %
LYMPHOCYTES # BLD AUTO: 2.47 X10(3) UL (ref 0.9–4)
LYMPHOCYTES NFR BLD AUTO: 41.2 %
M PROTEIN MFR SERPL ELPH: 7.5 G/DL (ref 6.1–8.3)
MCH RBC QN AUTO: 28 PG (ref 27–33.2)
MCHC RBC AUTO-ENTMCNC: 32.3 G/DL (ref 31–37)
MCV RBC AUTO: 86.8 FL (ref 81–100)
MONOCYTES # BLD AUTO: 0.44 X10(3) UL (ref 0.1–0.6)
MONOCYTES NFR BLD AUTO: 7.3 %
NEUTROPHIL ABS PRELIM: 2.89 X10 (3) UL (ref 1.3–6.7)
NEUTROPHILS # BLD AUTO: 2.89 X10(3) UL (ref 1.3–6.7)
NEUTROPHILS NFR BLD AUTO: 48.3 %
PLATELET # BLD AUTO: 242 10(3)UL (ref 150–450)
RBC # BLD AUTO: 4.68 X10(6)UL (ref 3.8–5.1)
RED CELL DISTRIBUTION WIDTH-SD: 53.9 FL (ref 35.1–46.3)
SED RATE-ML: 15 MM/HR (ref 0–25)
WBC # BLD AUTO: 6 X10(3) UL (ref 4–13)

## 2017-09-22 PROCEDURE — 82565 ASSAY OF CREATININE: CPT

## 2017-09-22 PROCEDURE — 86140 C-REACTIVE PROTEIN: CPT

## 2017-09-22 PROCEDURE — 80076 HEPATIC FUNCTION PANEL: CPT

## 2017-09-22 PROCEDURE — 84520 ASSAY OF UREA NITROGEN: CPT

## 2017-09-22 PROCEDURE — 85652 RBC SED RATE AUTOMATED: CPT

## 2017-09-22 PROCEDURE — 36415 COLL VENOUS BLD VENIPUNCTURE: CPT

## 2017-09-22 PROCEDURE — 85025 COMPLETE CBC W/AUTO DIFF WBC: CPT

## 2017-09-22 NOTE — TELEPHONE ENCOUNTER
Shital calling from [facility] requesting information.  Informed Shital office was waiting for permission from pt as it was an unusual request. Omayra Mendieta will send fax with contact information and request for information

## 2017-09-26 ENCOUNTER — DOCUMENTATION ONLY (OUTPATIENT)
Dept: HEMATOLOGY/ONCOLOGY | Facility: HOSPITAL | Age: 52
End: 2017-09-26

## 2017-09-26 NOTE — PROGRESS NOTES
Pt scheduled for Remicade infusion tomorrow. Spoke with patient regarding recent URI. She will complete IV abx Thursday. Infusion rescheduled for Fri- Pt aware.

## 2017-09-27 ENCOUNTER — APPOINTMENT (OUTPATIENT)
Dept: HEMATOLOGY/ONCOLOGY | Age: 52
End: 2017-09-27
Attending: FAMILY MEDICINE
Payer: COMMERCIAL

## 2017-09-29 ENCOUNTER — OFFICE VISIT (OUTPATIENT)
Dept: HEMATOLOGY/ONCOLOGY | Age: 52
End: 2017-09-29
Attending: FAMILY MEDICINE
Payer: COMMERCIAL

## 2017-09-29 VITALS
OXYGEN SATURATION: 100 % | BODY MASS INDEX: 39 KG/M2 | DIASTOLIC BLOOD PRESSURE: 80 MMHG | RESPIRATION RATE: 16 BRPM | HEART RATE: 98 BPM | WEIGHT: 244 LBS | SYSTOLIC BLOOD PRESSURE: 125 MMHG | TEMPERATURE: 98 F

## 2017-09-29 DIAGNOSIS — L40.50 PSORIATIC ARTHROPATHY (HCC): Primary | ICD-10-CM

## 2017-09-29 PROBLEM — K58.0 IRRITABLE BOWEL SYNDROME WITH DIARRHEA: Status: RESOLVED | Noted: 2017-03-06 | Resolved: 2017-09-29

## 2017-09-29 PROCEDURE — 96415 CHEMO IV INFUSION ADDL HR: CPT

## 2017-09-29 PROCEDURE — 96413 CHEMO IV INFUSION 1 HR: CPT

## 2017-10-16 ENCOUNTER — TELEPHONE (OUTPATIENT)
Dept: FAMILY MEDICINE CLINIC | Facility: CLINIC | Age: 52
End: 2017-10-16

## 2017-10-16 ENCOUNTER — OFFICE VISIT (OUTPATIENT)
Dept: FAMILY MEDICINE CLINIC | Facility: CLINIC | Age: 52
End: 2017-10-16

## 2017-10-16 VITALS
WEIGHT: 240 LBS | HEIGHT: 66.25 IN | TEMPERATURE: 99 F | DIASTOLIC BLOOD PRESSURE: 80 MMHG | RESPIRATION RATE: 18 BRPM | HEART RATE: 75 BPM | BODY MASS INDEX: 38.57 KG/M2 | OXYGEN SATURATION: 98 % | SYSTOLIC BLOOD PRESSURE: 124 MMHG

## 2017-10-16 DIAGNOSIS — M20.11 HALLUX VALGUS, ACQUIRED, BILATERAL: ICD-10-CM

## 2017-10-16 DIAGNOSIS — F41.9 ANXIETY: ICD-10-CM

## 2017-10-16 DIAGNOSIS — M20.12 HALLUX VALGUS, ACQUIRED, BILATERAL: ICD-10-CM

## 2017-10-16 DIAGNOSIS — L40.50 PSORIATIC ARTHROPATHY (HCC): ICD-10-CM

## 2017-10-16 DIAGNOSIS — M77.12 LEFT LATERAL EPICONDYLITIS: ICD-10-CM

## 2017-10-16 DIAGNOSIS — M77.42 METATARSALGIA OF BOTH FEET: ICD-10-CM

## 2017-10-16 DIAGNOSIS — F33.42 RECURRENT MAJOR DEPRESSIVE DISORDER, IN FULL REMISSION (HCC): ICD-10-CM

## 2017-10-16 DIAGNOSIS — E89.0 POSTOPERATIVE HYPOTHYROIDISM: ICD-10-CM

## 2017-10-16 DIAGNOSIS — M54.50 BILATERAL LOW BACK PAIN WITHOUT SCIATICA, UNSPECIFIED CHRONICITY: ICD-10-CM

## 2017-10-16 DIAGNOSIS — E28.2 POLYCYSTIC OVARIES: ICD-10-CM

## 2017-10-16 DIAGNOSIS — M77.41 METATARSALGIA OF BOTH FEET: ICD-10-CM

## 2017-10-16 DIAGNOSIS — Z12.39 BREAST CANCER SCREENING: Primary | ICD-10-CM

## 2017-10-16 DIAGNOSIS — G47.00 INSOMNIA, UNSPECIFIED TYPE: ICD-10-CM

## 2017-10-16 DIAGNOSIS — Z85.850 HISTORY OF THYROID CANCER: ICD-10-CM

## 2017-10-16 PROCEDURE — 99214 OFFICE O/P EST MOD 30 MIN: CPT | Performed by: FAMILY MEDICINE

## 2017-10-16 RX ORDER — LEVOTHYROXINE SODIUM 112 UG/1
TABLET ORAL
Qty: 90 TABLET | Refills: 1 | Status: SHIPPED | OUTPATIENT
Start: 2017-10-16 | End: 2018-04-20

## 2017-10-16 RX ORDER — ALPRAZOLAM 0.25 MG/1
0.25 TABLET ORAL 2 TIMES DAILY PRN
Qty: 30 TABLET | Refills: 0 | Status: SHIPPED | OUTPATIENT
Start: 2017-10-16 | End: 2018-02-12

## 2017-10-16 RX ORDER — TRAZODONE HYDROCHLORIDE 50 MG/1
50 TABLET ORAL NIGHTLY PRN
Qty: 90 TABLET | Refills: 1 | Status: SHIPPED | OUTPATIENT
Start: 2017-10-16 | End: 2018-07-20

## 2017-10-16 RX ORDER — LISINOPRIL AND HYDROCHLOROTHIAZIDE 25; 20 MG/1; MG/1
1 TABLET ORAL
Qty: 90 TABLET | Refills: 1 | Status: SHIPPED | OUTPATIENT
Start: 2017-10-16 | End: 2018-06-17

## 2017-10-16 RX ORDER — VENLAFAXINE HYDROCHLORIDE 75 MG/1
75 CAPSULE, EXTENDED RELEASE ORAL
Qty: 90 CAPSULE | Refills: 1 | Status: SHIPPED | OUTPATIENT
Start: 2017-10-16 | End: 2018-01-14

## 2017-10-16 RX ORDER — BACLOFEN 20 MG/1
TABLET ORAL
Qty: 90 TABLET | Refills: 1 | Status: SHIPPED | OUTPATIENT
Start: 2017-10-16 | End: 2018-04-20

## 2017-10-16 RX ORDER — BUPROPION HYDROCHLORIDE 300 MG/1
TABLET ORAL
Qty: 90 TABLET | Refills: 1 | Status: SHIPPED | OUTPATIENT
Start: 2017-10-16 | End: 2018-04-20

## 2017-10-16 RX ORDER — PANTOPRAZOLE SODIUM 40 MG/1
TABLET, DELAYED RELEASE ORAL
Qty: 90 TABLET | Refills: 1 | Status: SHIPPED | OUTPATIENT
Start: 2017-10-16 | End: 2018-07-17

## 2017-10-17 NOTE — PATIENT INSTRUCTIONS
Understanding Lateral Epicondylitis    Tendons are strong bands of tissue that connect muscles to bones. Lateral epicondylitis affects the tendons that connect muscles in the forearm to the lateral epicondyle.  This is the bony knob on the outer side of t · Taking pain medicines. Taking prescription or over-the-counter pain medicines may help reduce pain and swelling.    · Wearing a brace. This helps reduce strain on the muscles and tendons in the forearm, which may relieve symptoms.  It is very important to

## 2017-10-17 NOTE — PROGRESS NOTES
CHIEF COMPLAINT: Patient presents with: Follow - Up: medication refills      HPI:     Ruslan Rodas is a 47year old female presents for med refill visit. Chronic disease. Patient presents for recheck of hypertension.  Pt has been taking medications as i [facility]) 1994    thyroidectomy      Past Surgical History:  1968: ADENOIDECTOMY  1/1/2006: ARTHROSCOPY OF JOINT UNLISTED      Comment: Knee Arthroscopy  No date: KNEE REPLACEMENT SURGERY  1/1/1989: ORAL SURGERY PROCEDURE      Comment: Tooth Extraction, \" wis BREAKFAST Disp: 90 tablet Rfl: 1   Lisinopril-Hydrochlorothiazide 20-25 MG Oral Tab Take 1 tablet by mouth once daily. Disp: 90 tablet Rfl: 1   ALPRAZolam 0.25 MG Oral Tab Take 1 tablet (0.25 mg total) by mouth 2 (two) times daily as needed for Sleep.  Disp elbow pain or point tenderness. NROM right elbow. Grasp + 5 bilateral.   Skin: no acute rashes  Neuro: AOx3. Normal gait  Psyche: Slightly flat affect. Good eye contact. Well-groomed. No tearfulness and crying. Normal speech. Appropriate insight.   Adams Rebollar ALPRAZolam 0.25 MG Oral Tab; Take 1 tablet (0.25 mg total) by mouth 2 (two) times daily as needed for Sleep. Dispense: 30 tablet; Refill: 0    2.  Recurrent major depressive disorder, [facility]   holidays approaching and stable  Do not want t 20 MG Oral Tab 90 tablet 1      Sig: TAKE ONE TABLET BY MOUTH EVERY EVENING      Pantoprazole Sodium 40 MG Oral Tab EC 90 tablet 1      Sig: Take 1 tablet (40 mg total) by mouth every morning before breakfast.      TraZODone HCl 50 MG Oral Tab 90 tablet 1 (OBY=[id number])     10/16/2017  Esperanza Melgar, DO      Patient understands plan and follow-up. Return in about 2 months (around 12/16/2017) for annual physical- sooner if worse.

## 2017-10-17 NOTE — TELEPHONE ENCOUNTER
Seen last night. H/o thyroid cancer >10 years ago. Cannot find records as to type of cancer. May have been performed at another hospital or prior to [facility].   Please call patient and ask if she knows type of hemorrhoid cancer and if saw endocrinologist post

## 2017-10-18 ENCOUNTER — APPOINTMENT (OUTPATIENT)
Dept: HEMATOLOGY/ONCOLOGY | Age: 52
End: 2017-10-18
Attending: INTERNAL MEDICINE
Payer: COMMERCIAL

## 2017-10-20 NOTE — TELEPHONE ENCOUNTER
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     Pt was followed by surgeon, Dr Miladis Carter for 10 + years, had labs and scans    Pt saw endocrine after

## 2017-10-23 ENCOUNTER — PATIENT MESSAGE (OUTPATIENT)
Dept: FAMILY MEDICINE CLINIC | Facility: CLINIC | Age: 52
End: 2017-10-23

## 2017-10-23 DIAGNOSIS — F33.42 RECURRENT MAJOR DEPRESSIVE DISORDER, IN FULL REMISSION (HCC): ICD-10-CM

## 2017-10-23 DIAGNOSIS — F41.9 ANXIETY: ICD-10-CM

## 2017-10-24 DIAGNOSIS — F41.9 ANXIETY: ICD-10-CM

## 2017-10-24 DIAGNOSIS — F33.42 RECURRENT MAJOR DEPRESSIVE DISORDER, IN FULL REMISSION (HCC): ICD-10-CM

## 2017-10-24 RX ORDER — VENLAFAXINE HYDROCHLORIDE 150 MG/1
150 CAPSULE, EXTENDED RELEASE ORAL
Qty: 90 CAPSULE | Refills: 0 | Status: SHIPPED | OUTPATIENT
Start: 2017-10-24 | End: 2018-01-29

## 2017-10-24 NOTE — TELEPHONE ENCOUNTER
Pt requesting refill on Venlafaxine, no protocol, unable to approve    LOV 10/16/17    LF 7/24/17 #90    Future Appointments  Date Time Provider Dyan Gutiérrez   10/27/2017 2:30 PM DREA ARMAS RN2 PF CHEMO I Frank   12/18/2017 6:20 PM Robert Weinberg DO

## 2017-10-24 NOTE — TELEPHONE ENCOUNTER
From: Yvette Wilhelm  To: Keshawn Restrepo DO  Sent: 10/23/2017 6:29 PM CDT  Subject: Prescription Question    Hi - I need a refill of the venlafaxine 150 mg. 90-day. Thanks so much.

## 2017-10-25 RX ORDER — VENLAFAXINE HYDROCHLORIDE 150 MG/1
150 CAPSULE, EXTENDED RELEASE ORAL
Qty: 90 CAPSULE | Refills: 0
Start: 2017-10-25

## 2017-10-25 NOTE — TELEPHONE ENCOUNTER
From: Linda Marin  Sent: 10/24/2017 9:10 PM CDT  Subject: Medication Renewal Request    Augustina Infante would like a refill of the following medications:     Venlafaxine HCl  MG Oral Capsule SR 24 Hr DANILO Santos    Preferred pharmacy: [facility]

## 2017-10-27 ENCOUNTER — OFFICE VISIT (OUTPATIENT)
Dept: HEMATOLOGY/ONCOLOGY | Age: 52
End: 2017-10-27
Attending: FAMILY MEDICINE
Payer: COMMERCIAL

## 2017-10-27 VITALS
SYSTOLIC BLOOD PRESSURE: 125 MMHG | OXYGEN SATURATION: 98 % | RESPIRATION RATE: 16 BRPM | HEART RATE: 94 BPM | DIASTOLIC BLOOD PRESSURE: 79 MMHG | TEMPERATURE: 98 F

## 2017-10-27 DIAGNOSIS — L40.50 PSORIATIC ARTHROPATHY (HCC): Primary | ICD-10-CM

## 2017-10-27 PROCEDURE — 96413 CHEMO IV INFUSION 1 HR: CPT

## 2017-10-27 PROCEDURE — 96415 CHEMO IV INFUSION ADDL HR: CPT

## 2017-10-27 PROCEDURE — 96367 TX/PROPH/DG ADDL SEQ IV INF: CPT

## 2017-10-27 PROCEDURE — 96365 THER/PROPH/DIAG IV INF INIT: CPT

## 2017-11-27 ENCOUNTER — HOSPITAL ENCOUNTER (OUTPATIENT)
Dept: MAMMOGRAPHY | Age: 52
Discharge: HOME | End: 2017-11-27
Attending: FAMILY MEDICINE
Payer: COMMERCIAL

## 2017-11-27 ENCOUNTER — OFFICE VISIT (OUTPATIENT)
Dept: HEMATOLOGY/ONCOLOGY | Age: 52
End: 2017-11-27
Attending: FAMILY MEDICINE
Payer: COMMERCIAL

## 2017-11-27 VITALS
TEMPERATURE: 99 F | SYSTOLIC BLOOD PRESSURE: 123 MMHG | HEART RATE: 92 BPM | RESPIRATION RATE: 18 BRPM | DIASTOLIC BLOOD PRESSURE: 83 MMHG | OXYGEN SATURATION: 99 %

## 2017-11-27 DIAGNOSIS — Z12.39 BREAST CANCER SCREENING: ICD-10-CM

## 2017-11-27 DIAGNOSIS — L40.50 PSORIATIC ARTHROPATHY (HCC): Primary | ICD-10-CM

## 2017-11-27 PROCEDURE — 96415 CHEMO IV INFUSION ADDL HR: CPT

## 2017-11-27 PROCEDURE — 77067 SCR MAMMO BI INCL CAD: CPT | Performed by: FAMILY MEDICINE

## 2017-11-27 PROCEDURE — 96413 CHEMO IV INFUSION 1 HR: CPT

## 2017-11-29 ENCOUNTER — NURSE ONLY (OUTPATIENT)
Dept: HEMATOLOGY/ONCOLOGY | Age: 52
End: 2017-11-29
Attending: FAMILY MEDICINE
Payer: COMMERCIAL

## 2017-11-29 DIAGNOSIS — L40.50 PSORIATIC ARTHROPATHY (HCC): ICD-10-CM

## 2017-11-29 DIAGNOSIS — Z79.899 ENCOUNTER FOR DRUG THERAPY: ICD-10-CM

## 2017-11-29 PROCEDURE — 80076 HEPATIC FUNCTION PANEL: CPT

## 2017-11-29 PROCEDURE — 85025 COMPLETE CBC W/AUTO DIFF WBC: CPT

## 2017-11-29 PROCEDURE — 36415 COLL VENOUS BLD VENIPUNCTURE: CPT

## 2017-11-29 PROCEDURE — 86140 C-REACTIVE PROTEIN: CPT

## 2017-12-18 ENCOUNTER — OFFICE VISIT (OUTPATIENT)
Dept: FAMILY MEDICINE CLINIC | Facility: CLINIC | Age: 52
End: 2017-12-18

## 2017-12-18 VITALS
SYSTOLIC BLOOD PRESSURE: 124 MMHG | RESPIRATION RATE: 18 BRPM | HEART RATE: 86 BPM | WEIGHT: 242 LBS | OXYGEN SATURATION: 98 % | BODY MASS INDEX: 39 KG/M2 | TEMPERATURE: 98 F | DIASTOLIC BLOOD PRESSURE: 86 MMHG

## 2017-12-18 DIAGNOSIS — Z13.228 SCREENING FOR ENDOCRINE, NUTRITIONAL, METABOLIC AND IMMUNITY DISORDER: ICD-10-CM

## 2017-12-18 DIAGNOSIS — Z13.6 SCREENING FOR HEART DISEASE: ICD-10-CM

## 2017-12-18 DIAGNOSIS — S89.92XA INJURY OF LEFT KNEE, INITIAL ENCOUNTER: ICD-10-CM

## 2017-12-18 DIAGNOSIS — Z13.0 SCREENING FOR ENDOCRINE, NUTRITIONAL, METABOLIC AND IMMUNITY DISORDER: ICD-10-CM

## 2017-12-18 DIAGNOSIS — Z00.00 ANNUAL PHYSICAL EXAM: Primary | ICD-10-CM

## 2017-12-18 DIAGNOSIS — Z13.21 SCREENING FOR ENDOCRINE, NUTRITIONAL, METABOLIC AND IMMUNITY DISORDER: ICD-10-CM

## 2017-12-18 DIAGNOSIS — Z13.29 SCREENING FOR ENDOCRINE, NUTRITIONAL, METABOLIC AND IMMUNITY DISORDER: ICD-10-CM

## 2017-12-18 PROCEDURE — 99213 OFFICE O/P EST LOW 20 MIN: CPT | Performed by: FAMILY MEDICINE

## 2017-12-18 PROCEDURE — 99396 PREV VISIT EST AGE 40-64: CPT | Performed by: FAMILY MEDICINE

## 2017-12-18 RX ORDER — MULTIVIT-MIN/IRON/FOLIC ACID/K 18-600-40
CAPSULE ORAL
COMMUNITY

## 2017-12-18 RX ORDER — METHYLPREDNISOLONE 4 MG/1
TABLET ORAL
Qty: 21 TABLET | Refills: 0 | Status: SHIPPED | OUTPATIENT
Start: 2017-12-18 | End: 2018-01-15 | Stop reason: ALTCHOICE

## 2017-12-18 RX ORDER — MELOXICAM 15 MG/1
15 TABLET ORAL DAILY
Qty: 30 TABLET | Refills: 0 | Status: SHIPPED | OUTPATIENT
Start: 2017-12-18 | End: 2018-01-15 | Stop reason: ALTCHOICE

## 2017-12-19 NOTE — PATIENT INSTRUCTIONS
Perform labs fasting 8 hours with water or black coffee or or black tea diet  soda only prior to exam.    -Encourage healthy diet of whole food and avoid processed food and sugary drinks and sodas.   Diet should include lean meats and vegetables including 5 Take Rx meloxicam or ibuprofen or voltaren or naproxen as prescribed- start after medrol. Take with food and 10 oz glass of water. Take your anti inflammatory medicine with food, stop and call if GI side effects.  Your are at risk of GI upset, stomach ulce Cervical cancer All women in this age group, except women who have had a complete hysterectomy Pap test every 3 years or Pap test with human papillomavirus (HPV) test every 5 years   Chlamydia Women at increased risk for infection At routine exams   Colore Hepatitis B Women at increased risk for infection – talk with your healthcare provider 3 doses over 6 months; second dose should be given 1 month after the first dose; the third dose should be given at least 2 months after the second dose and at least 4 mo Use of daily aspirin Women ages 54 and up in this age group who are at risk for cardiovascular health problems such as stroke When your risk is known   Use of tobacco and the health effects it can cause All women in this age group Every exam   [address] In later years, both men and women need to take extra care of their bones. By this point, the body loses more bone than it makes. If too much bone is lost, you may be at risk for fractures. With age, the quality and quantity of bone declines.  You can lesse Cheddar cheese   205 mg/1 oz.   Navy beans, cooked   79 mg/1/2 cup   Kale   90 mg/1/2 cup   Ice cream strawberry   79 mg/1/2 cup           Orange, navel   56 mg/1 medium   Note: Calcium levels may vary depending on brand and size.   Daily calcium needs  14- · Always check with your healthcare provider before starting any new exercise program.  · Use weights only as instructed. · Stop any exercise that causes pain. Date Last Reviewed: 10/17/2015  © 6431-8241 [employer] [address].  [facility] · Apply an ice pack over the injured area for 15 to 20 minutes every 3 to 6 hours.  You should do this for the first 24 to 48 hours. You can make an ice pack by filling a plastic bag that seals at the top with ice cubes and then wrapping it with a thin towe When to seek medical advice  Call your healthcare provider right away if any of these occur:  · The splint or knee immobilizer brace becomes wet or soft  · The fiberglass cast or splint remains wet for more than 24 hours  · Pain or swelling increases  · Th

## 2017-12-19 NOTE — PROGRESS NOTES
REASON FOR VISIT:    Jessica Zepeda is a 47year old female who presents for an [address]. Left knee pain x off and on x 1-2 months posteriorly.   Patient's been exercising 3 days a week for an hour between elliptical and bike burning 2-300 Cholecalciferol (VITAMIN D) 2000 units Oral Cap Take by mouth. Disp:  Rfl:    Abatacept (ORENCIA) 125 MG/ML Subcutaneous Solution Prefilled Syringe Inject 125 mg into the skin once a week.  Disp: 12 Syringe Rfl: 3   leflunomide 20 MG Oral Tab Take 1 tablet Lab Results  Component Value Date   HDL 33 (L) 07/11/2012   HDL 44 (L) 08/09/2011     No results found for: TRIGLY    Lab Results  Component Value Date    07/11/2012    08/09/2011       Lab Results  Component Value Date   AST 27 11/29/2017 Chlamydia Screening Screen Annually age<25, if sex active/on OCPs; >24 high risk No results found for: CHLAMYDIA    Colonoscopy Screen Every 10 years Colonoscopy,10 Years due on 06/02/2026    Flex Sigmoidoscopy Screen  Every 5 years No results found for th Digoxin Serum Conc  Annually No results found for: DIGOXIN No flowsheet data found. Diabetes      HgbA1C  Annually HgbA1C (%)   Date Value   08/26/2016 5.6    No flowsheet data found.     Creat/alb ratio  Annually CREATININE (mg/dL)   Date Value   04/29 Levothyroxine Sodium 112 MCG Oral Tab TAKE ONE TABLET BY MOUTH EVERY DAY BEFORE BREAKFAST Disp: 90 tablet Rfl: 1   Lisinopril-Hydrochlorothiazide 20-25 MG Oral Tab Take 1 tablet by mouth once daily.  Disp: 90 tablet Rfl: 1   ALPRAZolam 0.25 MG Oral Tab Take EYES: denies blurred vision or double vision  HEENT: denies nasal congestion, sinus pain or ST  LUNGS: denies shortness of breath with exertion  CARDIOVASCULAR: denies chest pain on exertion  GI: denies abdominal pain, denies heartburn  : denies dysuria, Royce Barreto is a 47year old female who presents for an [address] PLAN SUMMARY:   1. Annual physical exam  Healthy-weight loss recommended. Increase protein in diet.  Vegetarian  Hold exercise until after knee heals.  -Encourage health Administered Date(s) Administered   • Influenza 10/04/2016, 10/08/2017, 10/10/2017   • TDAP 05/08/2012   • Varicella Deferred (Had Chicken Pox) 07/07/1971       Influenza Annually   Pneumococcal if high risk   Td/Tdap once then every 10 years   HPV Females

## 2018-01-15 ENCOUNTER — OFFICE VISIT (OUTPATIENT)
Dept: FAMILY MEDICINE CLINIC | Facility: CLINIC | Age: 53
End: 2018-01-15

## 2018-01-15 VITALS
WEIGHT: 243.81 LBS | SYSTOLIC BLOOD PRESSURE: 126 MMHG | RESPIRATION RATE: 16 BRPM | TEMPERATURE: 99 F | HEART RATE: 78 BPM | DIASTOLIC BLOOD PRESSURE: 80 MMHG | BODY MASS INDEX: 39 KG/M2 | OXYGEN SATURATION: 98 %

## 2018-01-15 DIAGNOSIS — S30.860A TICK BITE OF BUTTOCK, INITIAL ENCOUNTER: ICD-10-CM
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
## 2018-01-15 DIAGNOSIS — L98.9 SKIN LESIONS: ICD-10-CM

## 2018-01-15 DIAGNOSIS — M25.562 ACUTE PAIN OF LEFT KNEE: Primary | ICD-10-CM

## 2018-01-15 PROCEDURE — 99214 OFFICE O/P EST MOD 30 MIN: CPT | Performed by: FAMILY MEDICINE

## 2018-01-15 RX ORDER — LIDOCAINE 50 MG/G
1 PATCH TOPICAL EVERY 24 HOURS
Qty: 30 PATCH | Refills: 0 | Status: SHIPPED | OUTPATIENT
Start: 2018-01-15 | End: 2018-06-18

## 2018-01-15 NOTE — PROGRESS NOTES
CHIEF COMPLAINT: Patient presents with: Follow - Up: knee pain     HPI:     Anna Rodriguez is a 47year old female presents for Fu labs and left knee pain. Not completed knee xray. not completed labs  Taking meloxicam 15 mg daily and completed steroid pack Tooth Extraction, \" wisdom teeth x 4\"  1/1/1994: OTHER SURGICAL HISTORY      Comment: thyroidectomy due to cancer  1968: TONSILLECTOMY   Family History   Problem Relation Age of Onset   • Hypertension Father    • Heart Disorder Father    • Cancer Father Lisinopril-Hydrochlorothiazide 20-25 MG Oral Tab Take 1 tablet by mouth once daily. Disp: 90 tablet Rfl: 1   ALPRAZolam 0.25 MG Oral Tab Take 1 tablet (0.25 mg total) by mouth 2 (two) times daily as needed for Sleep.  Disp: 30 tablet Rfl: 0   Melatonin 3 of motion is decreased with flexion only to about 100° and extension to about 170 due to pain. No pain over entire medial collateral ligament test. negative McMurrays   NEURO:LE DTR +2/4 bilateral equal and symmetric.  LE MS +5/5 bilateral and equal and s meloxicam if needed  - ORTHOPEDIC - INTERNAL-[place]    2. Skin lesions  - DERM - INTERNAL-     3.  Tick bite of buttock, initial encounter  Rare will have retained products 9 mos later needing removal, pt requests to see Rheum  Immunocompromised an

## 2018-01-16 NOTE — PATIENT INSTRUCTIONS
Tick Bites  Ticks are small arachnids that feed on the blood of rodents, rabbits, birds, deer, dogs, and people. A tick bite may cause a reaction like a spider bite. You may have redness, itching, and slight swelling at the site.  Sometimes you may have n · Body aches and joint pain  · Severe headache  Date Last Reviewed: 12/1/2016  © 1699-5608 [employer]. [address] [facility], [address] [place] [address]. All rights reserved.  This information is not intended as a substitute for professional medical · Use insect repellent. Spray insect repellent containing at least 20 percent DEET on your exposed skin. You can also use it on clothing, shoes, and camping gear. Avoid getting DEET on children’s hands, mouth, or eyes.  You can use products with permethrin · Use pesticides. You can apply them yourself or hire a pest control expert. States have different regulations about pesticides. Ask your local health department for information. · Keep deer away.  Deer often carry the ticks that can infect you with Lyme d ¨ Wash your skin with soap and water after you remove the tick. This will help ensure you remove any bacteria. ¨ If you can, save the tick in a tightly sealed glass or plastic container. Take it to your healthcare provider.  He or she may be able to have s

## 2018-01-18 ENCOUNTER — APPOINTMENT (OUTPATIENT)
Dept: HEMATOLOGY/ONCOLOGY | Age: 53
End: 2018-01-18
Attending: FAMILY MEDICINE
Payer: COMMERCIAL

## 2018-01-18 DIAGNOSIS — Z00.00 ANNUAL PHYSICAL EXAM: ICD-10-CM

## 2018-01-18 DIAGNOSIS — Z13.0 SCREENING FOR ENDOCRINE, NUTRITIONAL, METABOLIC AND IMMUNITY DISORDER: ICD-10-CM

## 2018-01-18 DIAGNOSIS — S30.860A TICK BITE OF BUTTOCK, INITIAL ENCOUNTER: ICD-10-CM
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
## 2018-01-18 DIAGNOSIS — Z13.29 SCREENING FOR ENDOCRINE, NUTRITIONAL, METABOLIC AND IMMUNITY DISORDER: ICD-10-CM

## 2018-01-18 DIAGNOSIS — Z13.21 SCREENING FOR ENDOCRINE, NUTRITIONAL, METABOLIC AND IMMUNITY DISORDER: ICD-10-CM

## 2018-01-18 DIAGNOSIS — Z13.228 SCREENING FOR ENDOCRINE, NUTRITIONAL, METABOLIC AND IMMUNITY DISORDER: ICD-10-CM

## 2018-01-18 PROCEDURE — 82306 VITAMIN D 25 HYDROXY: CPT

## 2018-01-18 PROCEDURE — 86618 LYME DISEASE ANTIBODY: CPT

## 2018-01-18 PROCEDURE — 36415 COLL VENOUS BLD VENIPUNCTURE: CPT

## 2018-01-19 ENCOUNTER — TELEPHONE (OUTPATIENT)
Dept: FAMILY MEDICINE CLINIC | Facility: CLINIC | Age: 53
End: 2018-01-19

## 2018-01-19 LAB — 25-HYDROXYVITAMIN D (TOTAL): 55.4 NG/ML (ref 30–100)

## 2018-01-20 LAB — BORRELIA BURGDORFERI ABS, ELISA: 0.15 LIV

## 2018-01-22 ENCOUNTER — TELEPHONE (OUTPATIENT)
Dept: FAMILY MEDICINE CLINIC | Facility: CLINIC | Age: 53
End: 2018-01-22

## 2018-01-22 NOTE — TELEPHONE ENCOUNTER
Spoke to patient regarding labs, patient verbalized understanding. Will follow up in June.     Future Appointments  Date Time Provider Dyan Gutiérrez   1/23/2018 8:00 AM Marilyn Abrams MD [facility] MENTAL HEALTH NP CELY Navas   1/30/2018 4:40 PM Trudy Batista MD [facility]

## 2018-01-23 PROBLEM — M17.11 PRIMARY OSTEOARTHRITIS OF RIGHT KNEE: Status: ACTIVE | Noted: 2018-01-23

## 2018-01-23 NOTE — TELEPHONE ENCOUNTER
Prior auth denied by insurance    LMOM to return call to the office. Provided pt office phone [phone] along with office hours given.

## 2018-01-29 ENCOUNTER — PATIENT MESSAGE (OUTPATIENT)
Dept: FAMILY MEDICINE CLINIC | Facility: CLINIC | Age: 53
End: 2018-01-29

## 2018-01-29 DIAGNOSIS — F41.9 ANXIETY: ICD-10-CM

## 2018-01-29 DIAGNOSIS — F33.42 RECURRENT MAJOR DEPRESSIVE DISORDER, IN FULL REMISSION (HCC): ICD-10-CM

## 2018-01-29 RX ORDER — VENLAFAXINE HYDROCHLORIDE 150 MG/1
150 CAPSULE, EXTENDED RELEASE ORAL
Qty: 90 CAPSULE | Refills: 0 | Status: SHIPPED | OUTPATIENT
Start: 2018-01-29 | End: 2018-02-06

## 2018-01-29 NOTE — TELEPHONE ENCOUNTER
From: El Lockett  To: Brigette Schafer DO  Sent: 1/29/2018 2:25 PM CST  Subject: Prescription Question    I'm missing an Rx for Venlafaxine 150mg.  When my Rx were transferred to [facility] from [facility] that one wasn't on the list. Would you be

## 2018-01-29 NOTE — TELEPHONE ENCOUNTER
Patient states feeling much better and knee pain resolved, see Hello! Messenger message 1/29/18. Does not want lidocaine patches.

## 2018-01-29 NOTE — TELEPHONE ENCOUNTER
Pt requesting refill of venlafaxine 150 mg , no protocol  unable to approve:     Last Time Medication was Filled:  10/24/2017 #90     Last Office Visit with PCP: 1/15/2018     Future Appointments  Date Time Provider Dyan Gutiérrez   1/30/2018 4:40 PM Mark

## 2018-02-09 ENCOUNTER — NURSE ONLY (OUTPATIENT)
Dept: HEMATOLOGY/ONCOLOGY | Age: 53
End: 2018-02-09
Attending: FAMILY MEDICINE
Payer: COMMERCIAL

## 2018-02-09 DIAGNOSIS — Z79.899 ENCOUNTER FOR DRUG THERAPY: ICD-10-CM

## 2018-02-09 DIAGNOSIS — L40.50 PSORIATIC ARTHROPATHY (HCC): ICD-10-CM

## 2018-02-09 LAB
ALBUMIN SERPL-MCNC: 3.7 G/DL (ref 3.5–4.8)
ALP LIVER SERPL-CCNC: 124 U/L (ref 41–108)
ALT SERPL-CCNC: 25 U/L (ref 14–54)
AST SERPL-CCNC: 22 U/L (ref 15–41)
BASOPHILS # BLD AUTO: 0.09 X10(3) UL (ref 0–0.1)
BASOPHILS NFR BLD AUTO: 1.4 %
BILIRUB DIRECT SERPL-MCNC: 0.1 MG/DL (ref 0.1–0.5)
BILIRUB SERPL-MCNC: 0.3 MG/DL (ref 0.1–2)
C-REACTIVE PROTEIN: <0.29 MG/DL (ref ?–1)
EOSINOPHIL # BLD AUTO: 0.1 X10(3) UL (ref 0–0.3)
EOSINOPHIL NFR BLD AUTO: 1.6 %
ERYTHROCYTE [DISTWIDTH] IN BLOOD BY AUTOMATED COUNT: 17.5 % (ref 11.5–16)
HCT VFR BLD AUTO: 40.6 % (ref 34–50)
HGB BLD-MCNC: 13 G/DL (ref 12–16)
IMMATURE GRANULOCYTE COUNT: 0.02 X10(3) UL (ref 0–1)
IMMATURE GRANULOCYTE RATIO %: 0.3 %
LYMPHOCYTES # BLD AUTO: 2.61 X10(3) UL (ref 0.9–4)
LYMPHOCYTES NFR BLD AUTO: 40.5 %
M PROTEIN MFR SERPL ELPH: 7.4 G/DL (ref 6.1–8.3)
MCH RBC QN AUTO: 26.7 PG (ref 27–33.2)
MCHC RBC AUTO-ENTMCNC: 32 G/DL (ref 31–37)
MCV RBC AUTO: 83.4 FL (ref 81–100)
MONOCYTES # BLD AUTO: 0.64 X10(3) UL (ref 0.1–1)
MONOCYTES NFR BLD AUTO: 9.9 %
NEUTROPHIL ABS PRELIM: 2.98 X10 (3) UL (ref 1.3–6.7)
NEUTROPHILS # BLD AUTO: 2.98 X10(3) UL (ref 1.3–6.7)
NEUTROPHILS NFR BLD AUTO: 46.3 %
PLATELET # BLD AUTO: 213 10(3)UL (ref 150–450)
RBC # BLD AUTO: 4.87 X10(6)UL (ref 3.8–5.1)
RED CELL DISTRIBUTION WIDTH-SD: 53 FL (ref 35.1–46.3)
WBC # BLD AUTO: 6.4 X10(3) UL (ref 4–13)

## 2018-02-09 PROCEDURE — 80076 HEPATIC FUNCTION PANEL: CPT

## 2018-02-09 PROCEDURE — 86140 C-REACTIVE PROTEIN: CPT

## 2018-02-09 PROCEDURE — 85025 COMPLETE CBC W/AUTO DIFF WBC: CPT

## 2018-02-09 PROCEDURE — 36415 COLL VENOUS BLD VENIPUNCTURE: CPT

## 2018-02-12 DIAGNOSIS — F41.9 ANXIETY: ICD-10-CM

## 2018-02-12 RX ORDER — ALPRAZOLAM 0.25 MG/1
0.25 TABLET ORAL 2 TIMES DAILY PRN
Qty: 30 TABLET | Refills: 0
Start: 2018-02-12 | End: 2018-06-18

## 2018-02-12 NOTE — TELEPHONE ENCOUNTER
From: Lona Sepulveda  Sent: 2/12/2018 10:51 AM CST  Subject: Medication Renewal Request    Augustina Dailey would like a refill of the following medications:     ALPRAZolam 0.25 MG Oral Tab Evelin Wright, DO]   Patient Comment: I only have a couple left from Oct. Rx. Been having increased anxiety with tightness in chest and difficulty taking a deep breath. Thinking a lot about my Dad and what happened. Would someone be able to call it in to [facility] on [address]? Thanks    Preferred pharmacy: [facility]/PHARMACY [address] [place], [place] - [address] [facility] [address] [facility] [address], [phone], [phone]

## 2018-02-12 NOTE — TELEPHONE ENCOUNTER
Pt requesting refill of alprazolam , no protocol,  unable to approve:  Please see patient's Ch[employer] message     Last Time Medication was Filled:  10/16/2017 #30 tabs w/ 0    Last Office Visit with PCP: 1/15/2018    Future Appointments  Date Time Provider Dyan Gutiérrez   2/15/2018 4:40 PM Nirali Kaminski MD [facility]   6/18/2018 5:40 PM Climmie Kussmaul, DO [facility] [place]

## 2018-02-26 RX ORDER — MELOXICAM 15 MG/1
TABLET ORAL
Qty: 30 TABLET | Refills: 0 | Status: SHIPPED | OUTPATIENT
Start: 2018-02-26 | End: 2018-07-29

## 2018-02-26 NOTE — TELEPHONE ENCOUNTER
Pt requesting refill of Meloxicam, no protocol, unable to approve:     Last Time Medication was Filled:  12/18/17 #30    Last Office Visit with PCP: 1/15/18    Future Appointments  Date Time Provider Dyan Gutiérrez   6/18/2018 5:40 PM Trupti Bright DO

## 2018-03-12 ENCOUNTER — APPOINTMENT (OUTPATIENT)
Dept: ULTRASOUND IMAGING | Age: 53
End: 2018-03-12
Attending: EMERGENCY MEDICINE
Payer: COMMERCIAL

## 2018-03-12 ENCOUNTER — TELEPHONE (OUTPATIENT)
Dept: FAMILY MEDICINE CLINIC | Facility: CLINIC | Age: 53
End: 2018-03-12

## 2018-03-12 ENCOUNTER — HOSPITAL ENCOUNTER (EMERGENCY)
Age: 53
Discharge: HOME | End: 2018-03-12
Attending: EMERGENCY MEDICINE
Payer: COMMERCIAL

## 2018-03-12 VITALS
OXYGEN SATURATION: 98 % | HEART RATE: 90 BPM | HEIGHT: 66 IN | TEMPERATURE: 99 F | SYSTOLIC BLOOD PRESSURE: 128 MMHG | RESPIRATION RATE: 18 BRPM | WEIGHT: 239 LBS | DIASTOLIC BLOOD PRESSURE: 78 MMHG | BODY MASS INDEX: 38.41 KG/M2

## 2018-03-12 DIAGNOSIS — M79.662 PAIN OF LEFT CALF: Primary | ICD-10-CM

## 2018-03-12 DIAGNOSIS — M71.22 SYNOVIAL CYST OF LEFT POPLITEAL SPACE: ICD-10-CM

## 2018-03-12 PROCEDURE — 99284 EMERGENCY DEPT VISIT MOD MDM: CPT

## 2018-03-12 PROCEDURE — 93971 EXTREMITY STUDY: CPT | Performed by: EMERGENCY MEDICINE

## 2018-03-12 NOTE — ED PROVIDER NOTES
Patient Seen in: [facility] Emergency Department In [place]    History   Patient presents with:  Deep Vein Thrombosis (cardiovascular)    Stated Complaint: calf pain    HPI    Patient is a 50-year-old female who presents emergency room with a history of s Wt 108.4 kg   SpO2 100%   BMI 38.58 kg/m²         Physical Exam  GENERAL: Well-developed, well-nourished female sitting up breathing easily in no apparent distress. Patient is nontoxic in appearance. HEENT: Head is normocephalic, atraumatic.  Pupils are 4 had no further complaints throughout the rest of the ER stay. Patient will be asked to elevate the leg at home with follow-up with her primary care doctor.   Patient was instructed to return to the ER immediately if increasing pain, or any other problems a

## 2018-03-12 NOTE — TELEPHONE ENCOUNTER
Patient called saying that she was having pain in her calf with swelling. Armando call her at work [phone].

## 2018-03-12 NOTE — TELEPHONE ENCOUNTER
Spoke with pt, she has been having pain in her calf for several weeks. She had an injury to her knee in December 2017 and treatment from Ortho. Pt went on an 11 hour car ride and wrapped her knee so that she was able to walk.   Pt noticed over the weekend t

## 2018-03-23 ENCOUNTER — TELEPHONE (OUTPATIENT)
Dept: FAMILY MEDICINE CLINIC | Facility: CLINIC | Age: 53
End: 2018-03-23

## 2018-03-23 NOTE — TELEPHONE ENCOUNTER
Spoke with pt regarding ED visit on 3/12/18.  Pt states she is much improved and will follow up as needed    Future Appointments  Date Time Provider Dyan Gutiérrez   6/18/2018 5:40 PM Robert Weinberg DO [facility] [place]   6/19/2018 5:40 PM Latonia Evans

## 2018-04-20 DIAGNOSIS — E89.0 POSTOPERATIVE HYPOTHYROIDISM: ICD-10-CM

## 2018-04-20 DIAGNOSIS — E28.2 POLYCYSTIC OVARIES: ICD-10-CM

## 2018-04-20 DIAGNOSIS — M54.50 BILATERAL LOW BACK PAIN WITHOUT SCIATICA, UNSPECIFIED CHRONICITY: ICD-10-CM

## 2018-04-23 RX ORDER — BACLOFEN 20 MG/1
TABLET ORAL
Qty: 90 TABLET | Refills: 1 | Status: SHIPPED | OUTPATIENT
Start: 2018-04-23 | End: 2018-10-26

## 2018-04-23 RX ORDER — BUPROPION HYDROCHLORIDE 300 MG/1
TABLET ORAL
Qty: 90 TABLET | Refills: 3 | Status: SHIPPED | OUTPATIENT
Start: 2018-04-23 | End: 2018-08-03

## 2018-04-23 RX ORDER — VENLAFAXINE HYDROCHLORIDE 75 MG/1
CAPSULE, EXTENDED RELEASE ORAL
Qty: 90 CAPSULE | Refills: 3 | Status: SHIPPED | OUTPATIENT
Start: 2018-04-23 | End: 2018-11-19

## 2018-04-23 RX ORDER — LEVOTHYROXINE SODIUM 112 UG/1
TABLET ORAL
Qty: 90 TABLET | Refills: 0 | Status: SHIPPED | OUTPATIENT
Start: 2018-04-23 | End: 2018-07-20

## 2018-04-23 NOTE — TELEPHONE ENCOUNTER
Pt requesting refill of Venlafaxine, baclofen, levothyroxine, buproprion, and metformin, protocol failed, unable to approve:     Last Time Medication was Filled:  10/16/17    Last Office Visit with PCP: 1/15/18      Future Appointments  Date Time Provider

## 2018-05-30 ENCOUNTER — APPOINTMENT (OUTPATIENT)
Dept: HEMATOLOGY/ONCOLOGY | Age: 53
End: 2018-05-30
Attending: FAMILY MEDICINE
Payer: COMMERCIAL

## 2018-05-30 PROCEDURE — 36415 COLL VENOUS BLD VENIPUNCTURE: CPT

## 2018-05-30 PROCEDURE — 84443 ASSAY THYROID STIM HORMONE: CPT | Performed by: FAMILY MEDICINE

## 2018-05-30 PROCEDURE — 36415 COLL VENOUS BLD VENIPUNCTURE: CPT | Performed by: INTERNAL MEDICINE

## 2018-05-30 PROCEDURE — 84439 ASSAY OF FREE THYROXINE: CPT | Performed by: FAMILY MEDICINE

## 2018-05-30 PROCEDURE — 85025 COMPLETE CBC W/AUTO DIFF WBC: CPT | Performed by: INTERNAL MEDICINE

## 2018-05-30 PROCEDURE — 86140 C-REACTIVE PROTEIN: CPT | Performed by: INTERNAL MEDICINE

## 2018-05-30 PROCEDURE — 80076 HEPATIC FUNCTION PANEL: CPT | Performed by: INTERNAL MEDICINE

## 2018-06-01 ENCOUNTER — TELEPHONE (OUTPATIENT)
Dept: FAMILY MEDICINE CLINIC | Facility: CLINIC | Age: 53
End: 2018-06-01

## 2018-06-01 NOTE — TELEPHONE ENCOUNTER
Spoke with pt, reviewed results and recommendations, pt verbalized understanding    Notes recorded by Brendon Platt DO on 5/30/2018 at 9:17 PM CDT  Labs are normal.  Mychart notified. Patient to followup for office directed at last OV.

## 2018-06-18 ENCOUNTER — OFFICE VISIT (OUTPATIENT)
Dept: FAMILY MEDICINE CLINIC | Facility: CLINIC | Age: 53
End: 2018-06-18

## 2018-06-18 VITALS
HEART RATE: 86 BPM | SYSTOLIC BLOOD PRESSURE: 136 MMHG | WEIGHT: 243.81 LBS | RESPIRATION RATE: 16 BRPM | OXYGEN SATURATION: 98 % | BODY MASS INDEX: 39 KG/M2 | DIASTOLIC BLOOD PRESSURE: 78 MMHG | TEMPERATURE: 99 F

## 2018-06-18 DIAGNOSIS — F51.04 PSYCHOPHYSIOLOGICAL INSOMNIA: ICD-10-CM

## 2018-06-18 DIAGNOSIS — H65.111 ACUTE ALLERGIC SEROUS OTITIS MEDIA OF RIGHT EAR: ICD-10-CM

## 2018-06-18 DIAGNOSIS — E78.00 PURE HYPERCHOLESTEROLEMIA: ICD-10-CM

## 2018-06-18 DIAGNOSIS — F41.9 ANXIETY AND DEPRESSION: ICD-10-CM

## 2018-06-18 DIAGNOSIS — F41.9 ANXIETY: ICD-10-CM

## 2018-06-18 DIAGNOSIS — I10 ESSENTIAL HYPERTENSION: Primary | ICD-10-CM

## 2018-06-18 DIAGNOSIS — M17.11 PRIMARY OSTEOARTHRITIS OF RIGHT KNEE: ICD-10-CM

## 2018-06-18 DIAGNOSIS — E89.0 POSTOPERATIVE HYPOTHYROIDISM: ICD-10-CM

## 2018-06-18 DIAGNOSIS — F32.A ANXIETY AND DEPRESSION: ICD-10-CM

## 2018-06-18 PROCEDURE — 99214 OFFICE O/P EST MOD 30 MIN: CPT | Performed by: FAMILY MEDICINE

## 2018-06-18 RX ORDER — ALPRAZOLAM 0.25 MG/1
0.25 TABLET ORAL 2 TIMES DAILY PRN
Qty: 30 TABLET | Refills: 0 | Status: SHIPPED | OUTPATIENT
Start: 2018-06-18 | End: 2018-10-08

## 2018-06-18 RX ORDER — LORATADINE 10 MG/1
10 TABLET ORAL DAILY
COMMUNITY

## 2018-06-18 RX ORDER — FLUTICASONE PROPIONATE 50 MCG
1 SPRAY, SUSPENSION (ML) NASAL 2 TIMES DAILY
Qty: 1 BOTTLE | Refills: 3 | Status: SHIPPED | OUTPATIENT
Start: 2018-06-18 | End: 2018-06-21

## 2018-06-18 RX ORDER — LISINOPRIL AND HYDROCHLOROTHIAZIDE 25; 20 MG/1; MG/1
1 TABLET ORAL
Qty: 90 TABLET | Refills: 3 | Status: SHIPPED | OUTPATIENT
Start: 2018-06-18 | End: 2018-10-08 | Stop reason: ALTCHOICE

## 2018-06-18 NOTE — TELEPHONE ENCOUNTER
Pt requesting refill of Lisinopril-HCTZ, protocol failed, unable to approve:     Last Time Medication was Filled:  10/16/17 #90 (1 refill)    Last Office Visit with PCP: 1/15/18    Future Appointments  Date Time Provider Dyan Gutiérrez   6/18/2018 5:40

## 2018-06-18 NOTE — PROGRESS NOTES
CHIEF COMPLAINT: No chief complaint on file. HPI:     Mirna Chawla is a 47year old female presents for medication monitoring    Patient presents for recheck of hypertension.  Pt has been taking medications as instructed, no medication side effects, no DISEASES     thyroid   • Psoriatic arthritis ([facility] [address]    • Thyroid cancer [facility]) 1994    thyroidectomy      Past Surgical History:  1968: ADENOIDECTOMY  1/1/2006: ARTHROSCOPY OF JOINT UNLISTED      Comment: Knee Arthroscopy  1/1/1989: ORAL SURGERY PROCEDURE TWICE DAILY WITH MEALS Disp: 180 tablet Rfl: 2   MELOXICAM 15 MG Oral Tab TAKE 1 TABLET BY MOUTH DAILY WITH FOOD AND WATER. STOP IF THERE IS GASTROINTESTINAL UPSET.  Disp: 30 tablet Rfl: 0   ALPRAZolam 0.25 MG Oral Tab Take 1 tablet (0.25 mg total) by mouth lesions  HEENT: atraumatic, normocephalic,   NECK: supple,no adenopathy,no bruits, no JVD  LUNGS: clear to auscultation bilateral. No RRW.  Good inspiratory and expiratory efferot  CARDIO: RRR without murmur, clear S1, S2  VS: no carotid artery or abdominal insomnia  Controlled  Continue trazodone prn    4.  Anxiety and depression  Controlled  Continue effexor  -contracts for safety- if suicidal or homicidal, call 911 or go to nearest ER and call office  -increased suicide risk on SSRI or SNRI in past  Continu joint dysfunction     Lumbago     Psoriatic arthropathy (HCC)     Pure hypercholesterolemia     Polycystic ovaries     Dysphagia, unspecified(887.20)     Obesity, unspecified     Insomnia     Other psoriasis     Anxiety     HTN (hypertension)     Hypothyro

## 2018-06-21 DIAGNOSIS — H65.111 ACUTE ALLERGIC SEROUS OTITIS MEDIA OF RIGHT EAR: ICD-10-CM

## 2018-06-21 RX ORDER — FLUTICASONE PROPIONATE 50 MCG
1 SPRAY, SUSPENSION (ML) NASAL 2 TIMES DAILY
Qty: 3 BOTTLE | Refills: 0 | Status: SHIPPED | OUTPATIENT
Start: 2018-06-21 | End: 2020-03-10

## 2018-06-21 NOTE — TELEPHONE ENCOUNTER
Pt requesting refill of Fluticasone, protocol passed. Refill approved.     LOV 6/18/18  LF 6/18/18 rx transferred to local pharmacy    Future Appointments  Date Time Provider Dyan Gutiérrez   7/5/2018 5:40 PM Edith Tracy MD [facility]   12/18/2

## 2018-07-17 RX ORDER — PANTOPRAZOLE SODIUM 40 MG/1
TABLET, DELAYED RELEASE ORAL
Qty: 90 TABLET | Refills: 0 | Status: SHIPPED | OUTPATIENT
Start: 2018-07-17 | End: 2018-10-07

## 2018-07-17 NOTE — TELEPHONE ENCOUNTER
Pt requesting a refill of Pantoprazole, no protocol. Unable to approve.     LOV 6/18/18  LF 10/16/17 #90 with 1 refill    Future Appointments  Date Time Provider Dyan Gutiérrez   10/16/2018 5:00 PM Hieu Kamara MD [facility]   12/18/2018 8:20 AM

## 2018-07-20 DIAGNOSIS — G47.00 INSOMNIA, UNSPECIFIED TYPE: ICD-10-CM

## 2018-07-20 DIAGNOSIS — E89.0 POSTOPERATIVE HYPOTHYROIDISM: ICD-10-CM

## 2018-07-20 RX ORDER — LEVOTHYROXINE SODIUM 112 UG/1
TABLET ORAL
Qty: 90 TABLET | Refills: 0 | Status: SHIPPED | OUTPATIENT
Start: 2018-07-20 | End: 2018-10-18

## 2018-07-20 RX ORDER — TRAZODONE HYDROCHLORIDE 50 MG/1
TABLET ORAL
Qty: 90 TABLET | Refills: 1 | Status: SHIPPED | OUTPATIENT
Start: 2018-07-20 | End: 2019-04-11

## 2018-07-20 NOTE — TELEPHONE ENCOUNTER
Pt requesting a refill of levothyroxine, protocol passed. Refill approved. LOV 6/18/18  LF 4/23/18 #90 with 1 refill    PT also requesting a refill of trazadone, no protocol. Unable to approve.     LF 10/16/17 #90 with 1 refill    Future Appointments  Da

## 2018-07-30 RX ORDER — MELOXICAM 15 MG/1
TABLET ORAL
Qty: 30 TABLET | Refills: 0 | Status: SHIPPED | OUTPATIENT
Start: 2018-07-30 | End: 2018-08-28

## 2018-07-30 NOTE — TELEPHONE ENCOUNTER
Pt requesting refill of Meloxicam, no protocol , unable to approve:     Last Time Medication was Filled:  2/26/18    Last Office Visit with PCP: 6/18/18    Future Appointments  Date Time Provider Dyan Gutiérrez   10/16/2018 5:00 PM Karina Crabtree MD [facility]

## 2018-08-03 DIAGNOSIS — F41.9 ANXIETY: ICD-10-CM

## 2018-08-03 DIAGNOSIS — F33.42 RECURRENT MAJOR DEPRESSIVE DISORDER, IN FULL REMISSION (HCC): ICD-10-CM

## 2018-08-03 RX ORDER — VENLAFAXINE HYDROCHLORIDE 150 MG/1
150 CAPSULE, EXTENDED RELEASE ORAL
Qty: 90 CAPSULE | Refills: 0 | Status: SHIPPED | OUTPATIENT
Start: 2018-08-03 | End: 2018-11-18

## 2018-08-03 NOTE — TELEPHONE ENCOUNTER
Pt requesting refill of Venlafaxine, no  protocol  unable to approve:     Last Time Medication was Filled:  4/23/18    Last Office Visit with PCP: 6/18/18    Future Appointments  Date Time Provider Dyan Gutiérrez   10/16/2018 5:00 PM Branden Tse MD [facility]

## 2018-08-28 RX ORDER — MELOXICAM 15 MG/1
TABLET ORAL
Qty: 30 TABLET | Refills: 3 | Status: SHIPPED | OUTPATIENT
Start: 2018-08-28 | End: 2018-10-18 | Stop reason: ALTCHOICE

## 2018-08-28 NOTE — TELEPHONE ENCOUNTER
Pt requesting refill of meloxicam, no protocol  unable to approve:     Last Time Medication was Filled:  7/30/18    Last Office Visit with PCP: 6/18/18      Future Appointments  Date Time Provider Dyan Gutiérrez   10/16/2018 5:00 PM Edith Tracy MD DAY

## 2018-09-28 ENCOUNTER — APPOINTMENT (OUTPATIENT)
Dept: GENERAL RADIOLOGY | Age: 53
End: 2018-09-28
Attending: FAMILY MEDICINE
Payer: COMMERCIAL

## 2018-09-28 ENCOUNTER — HOSPITAL ENCOUNTER (OUTPATIENT)
Age: 53
Discharge: ANOTHER HOSPITAL | End: 2018-09-28
Attending: FAMILY MEDICINE
Payer: COMMERCIAL

## 2018-09-28 ENCOUNTER — HOSPITAL ENCOUNTER (INPATIENT)
Facility: HOSPITAL | Age: 53
LOS: 1 days | Discharge: HOME | DRG: 189 | End: 2018-10-01
Attending: HOSPITALIST | Admitting: HOSPITALIST
Payer: COMMERCIAL

## 2018-09-28 ENCOUNTER — APPOINTMENT (OUTPATIENT)
Dept: CT IMAGING | Facility: HOSPITAL | Age: 53
DRG: 189 | End: 2018-09-28
Attending: PHYSICIAN ASSISTANT
Payer: COMMERCIAL

## 2018-09-28 VITALS
HEART RATE: 95 BPM | RESPIRATION RATE: 21 BRPM | OXYGEN SATURATION: 96 % | SYSTOLIC BLOOD PRESSURE: 121 MMHG | DIASTOLIC BLOOD PRESSURE: 67 MMHG | TEMPERATURE: 98 F

## 2018-09-28 DIAGNOSIS — R06.83 SNORES: ICD-10-CM

## 2018-09-28 DIAGNOSIS — J81.0 ACUTE PULMONARY EDEMA (HCC): Primary | ICD-10-CM

## 2018-09-28 DIAGNOSIS — R06.02 SHORTNESS OF BREATH: Primary | ICD-10-CM

## 2018-09-28 DIAGNOSIS — R06.83 SNORING: ICD-10-CM

## 2018-09-28 PROCEDURE — 99204 OFFICE O/P NEW MOD 45 MIN: CPT

## 2018-09-28 PROCEDURE — 93005 ELECTROCARDIOGRAM TRACING: CPT

## 2018-09-28 PROCEDURE — 99219 INITIAL OBSERVATION CARE,LEVL II: CPT | Performed by: HOSPITALIST

## 2018-09-28 PROCEDURE — 71046 X-RAY EXAM CHEST 2 VIEWS: CPT | Performed by: FAMILY MEDICINE

## 2018-09-28 PROCEDURE — 93010 ELECTROCARDIOGRAM REPORT: CPT

## 2018-09-28 PROCEDURE — 71275 CT ANGIOGRAPHY CHEST: CPT | Performed by: PHYSICIAN ASSISTANT

## 2018-09-28 PROCEDURE — 94640 AIRWAY INHALATION TREATMENT: CPT

## 2018-09-28 PROCEDURE — 99214 OFFICE O/P EST MOD 30 MIN: CPT

## 2018-09-28 RX ORDER — FUROSEMIDE 10 MG/ML
40 INJECTION INTRAMUSCULAR; INTRAVENOUS ONCE
Status: COMPLETED | OUTPATIENT
Start: 2018-09-28 | End: 2018-09-28

## 2018-09-28 RX ORDER — IPRATROPIUM BROMIDE AND ALBUTEROL SULFATE 2.5; .5 MG/3ML; MG/3ML
3 SOLUTION RESPIRATORY (INHALATION) ONCE
Status: COMPLETED | OUTPATIENT
Start: 2018-09-28 | End: 2018-09-28

## 2018-09-29 ENCOUNTER — APPOINTMENT (OUTPATIENT)
Dept: CV DIAGNOSTICS | Facility: HOSPITAL | Age: 53
DRG: 189 | End: 2018-09-29
Attending: HOSPITALIST
Payer: COMMERCIAL

## 2018-09-29 PROCEDURE — 99225 SUBSEQUENT OBSERVATION CARE: CPT | Performed by: HOSPITALIST

## 2018-09-29 PROCEDURE — 93306 TTE W/DOPPLER COMPLETE: CPT | Performed by: HOSPITALIST

## 2018-09-29 RX ORDER — DIPHENHYDRAMINE HYDROCHLORIDE 50 MG/ML
25 INJECTION INTRAMUSCULAR; INTRAVENOUS EVERY 4 HOURS PRN
Status: DISCONTINUED | OUTPATIENT
Start: 2018-09-29 | End: 2018-10-01

## 2018-09-29 RX ORDER — CETIRIZINE HYDROCHLORIDE 10 MG/1
10 TABLET ORAL DAILY
Status: DISCONTINUED | OUTPATIENT
Start: 2018-09-29 | End: 2018-10-01

## 2018-09-29 RX ORDER — KETOROLAC TROMETHAMINE 15 MG/ML
15 INJECTION, SOLUTION INTRAMUSCULAR; INTRAVENOUS EVERY 6 HOURS PRN
Status: DISPENSED | OUTPATIENT
Start: 2018-09-29 | End: 2018-10-01

## 2018-09-29 RX ORDER — LEVOTHYROXINE SODIUM 0.1 MG/1
100 TABLET ORAL
Status: DISCONTINUED | OUTPATIENT
Start: 2018-09-29 | End: 2018-10-01

## 2018-09-29 RX ORDER — LEFLUNOMIDE 20 MG/1
20 TABLET ORAL DAILY
Status: DISCONTINUED | OUTPATIENT
Start: 2018-09-29 | End: 2018-10-01

## 2018-09-29 RX ORDER — BACLOFEN 20 MG/1
20 TABLET ORAL 2 TIMES DAILY
Status: DISCONTINUED | OUTPATIENT
Start: 2018-09-29 | End: 2018-10-01

## 2018-09-29 RX ORDER — RIZATRIPTAN BENZOATE 10 MG/1
10 TABLET, ORALLY DISINTEGRATING ORAL ONCE
Status: DISCONTINUED | OUTPATIENT
Start: 2018-09-29 | End: 2018-10-01

## 2018-09-29 RX ORDER — ALPRAZOLAM 0.25 MG/1
0.25 TABLET ORAL 2 TIMES DAILY PRN
Status: DISCONTINUED | OUTPATIENT
Start: 2018-09-29 | End: 2018-10-01

## 2018-09-29 RX ORDER — PANTOPRAZOLE SODIUM 40 MG/1
40 TABLET, DELAYED RELEASE ORAL
Status: DISCONTINUED | OUTPATIENT
Start: 2018-09-29 | End: 2018-10-01

## 2018-09-29 RX ORDER — TRAZODONE HYDROCHLORIDE 50 MG/1
25 TABLET ORAL NIGHTLY PRN
Status: DISCONTINUED | OUTPATIENT
Start: 2018-09-29 | End: 2018-10-01

## 2018-09-29 RX ORDER — LISINOPRIL AND HYDROCHLOROTHIAZIDE 25; 20 MG/1; MG/1
1 TABLET ORAL
Status: DISCONTINUED | OUTPATIENT
Start: 2018-09-29 | End: 2018-09-29 | Stop reason: SDUPTHER

## 2018-09-29 NOTE — ED PROVIDER NOTES
Patient Seen in: [facility] Emergency Department    History   Patient presents with:  Dyspnea MEHNAZ SOB (respiratory)    Stated Complaint: mehnaz pt states she has fluid on her lungs    HPI    CHIEF COMPLAINT: Dyspnea on exertion, orthopnea, bilateral lo x2   • HYPERTENSION    • Lumbago 6/29/2012   • OSTEOARTHRITIS    • OTHER DISEASES     thyroid   • Psoriatic arthritis ([facility] [address]    • Thyroid cancer [facility]) 1994    thyroidectomy       Past Surgical History:  1968: ADENOIDECTOMY  1/1/2006: ARTHROSCOPY OF JOINT signs        Lateral lower extremities with edema noted, no pitting or weeping edema present.   Extremities are symmetrical, full range of motion  Psychiatric: there is no agitation    ED Course     Labs Reviewed   CBC W/ DIFFERENTIAL - Abnormal; Notable fo this visit. There is no disposition time on file for this visit. Follow-up:  No follow-up provider specified.       Medications Prescribed:  Current Discharge Medication List

## 2018-09-29 NOTE — ED PROVIDER NOTES
Rate, intervals and axes as noted on EKG Report. No acute ST Elevation or Depression, sinus rhythm with rate of 99, unremarkable EKG. The case is reviewed with the Physician Assistant. I agree with the assessment and plan.   We discussed the

## 2018-09-29 NOTE — CONSULTS
[address] [facility] Cardiology  Consultation Note      Augustina Mariangel Davalos Patient Status:  Observation    1965 MRN [id number]   [facility] 2NE-A Attending Cruz Saleh MD   Hosp Day # 0 PCP Jose Grier DO     Reason for consultation: ANXIETY    • DEPRESSION    • Hx of pregnancy 1998, 2003    x2   • HYPERTENSION    • Lumbago 6/29/2012   • OSTEOARTHRITIS    • OTHER DISEASES     thyroid   • Psoriatic arthritis ([facility] [address]    • Thy[facility] ([facility] 1994    thyroidectomy       Past Surgical Histo kg)  07/09/18 : 240 lb (108.9 kg)  06/18/18 : 243 lb 12.8 oz (110.6 kg)      General: Awake and alert; in no acute distress  HEENT: Extraocular movements are intact; sclerae are anicteric; scalp is atrauamatic; no thyromegaly  Neck: Supple; no JVD; no oro

## 2018-09-29 NOTE — PROGRESS NOTES
[facility]  Progress Note     Amy Charlyn Sever Patient Status:  Observation    1965 MRN [id number]   [facility] 2NE-A Attending Rosamaria Saenz MD   Hosp Day # 0 PCP Ulises Sosa DO     Chief Complaint: sob    S: Patient sob.   Rodrigues Malick 20/25) combination tablet ([facility] only)   Oral Daily   • Rizatriptan Benzoate  10 mg Oral Once       ASSESSMENT / PLAN:     1. Sob, infiltrates, interstitial changes-suspect autoimmune vs drug rxn; cannot rule out infectious but seems less likely  2.  Psoriati

## 2018-09-29 NOTE — ED NOTES
Pt sent back to ED from CT for IV not working. IV flushes but not fully advanced and CT unable to inject contrast. IV removed. New 20g IV inserted to LAC x1 attempt. Good blood return and flushes with ease.  Pt sent back to CT at this time

## 2018-09-29 NOTE — ED PROVIDER NOTES
Patient Seen in: [facility] Emergency Department    History   Patient presents with:  Dyspnea MEHNAZ SOB (respiratory)    Stated Complaint: mehnaz pt states she has fluid on her lungs    HPI    CHIEF COMPLAINT: Dyspnea on exertion, orthopnea, bilateral lo 1998, 2003    x2   • HYPERTENSION    • Lumbago 6/29/2012   • OSTEOARTHRITIS    • OTHER DISEASES     thyroid   • Psoriatic arthritis ([facility]    • Thyroid cancer [facility]) 1994    thyroidectomy       Past Surgical History:  1968: ADENOIDECTOMY  1/1/2006: Richard Sutton meningeal signs        Lateral lower extremities with edema noted, no pitting or weeping edema present.   Extremities are symmetrical, full range of motion  Psychiatric: there is no agitation       ED Course     Labs Reviewed   COMP METABOLIC PANEL (14) - A Date: 9/28/2018  CONCLUSION:  No acute intrathoracic process appreciated. Dictated by: Ruben Hale MD on 9/28/2018 at 20:11     Approved by: Ruben Hale MD             EKG     Rate, intervals and axes as noted on EKG Report.   Rate: 99  Rh Discharge Medication List        Present on Admission  Date Reviewed: 6/18/2018          ICD-10-CM Noted POA    Acute pulmonary edema ([facility] [address] J81.0 9/28/2018 Unknown

## 2018-09-29 NOTE — CONSULTS
Walter Ch[address] [facility] [address] Note [facility]  Report of Consultation    Augustina Prabhakar Patient Status:  Observation    1965 MRN [id number]   [facility] 2NE-A Attending Tristin Vivar UNLISTED      Comment:  Knee Arthroscopy  1/1/1989: ORAL SURGERY PROCEDURE      Comment:  Tooth Extraction, \" wisdom teeth x 4\"  1/1/1994: OTHER SURGICAL HISTORY      Comment:  thyroidectomy due to cancer  1968: TONSILLECTOMY  Family History   Problem Re irregular heart beats, syncope, fatigue, paroxysmal nocturnal dyspnea. Note HPI  Gastrointestinal: Negative for dysphagia, odynophagia, reflux symptoms, nausea, vomiting, change in bowel habits, diarrhea, constipation and abdominal pain.   Integument/breast normal  MOUTH: MMM, good dentition  NECK: Trachea midline, symmetric, no visible masses or scars, no crepitus, normal flexion/extension  CHEST: Normal chest excursion, no visible deformity or scars, no tenderness to palpation  PULMONARY:  CTAB no wrc  COR: Differential considerations include pulmonary edema and pneumonitis. There is no cardiomegaly. No pleural effusion. 2.  No CT evidence for pulmonary embolism.     Dictated by: Kenn Teran MD on 9/28/2018 at 22:34     Approved by: Kenn Teran

## 2018-09-29 NOTE — PROGRESS NOTES
09/29/18 1636   Clinical Encounter Type   Visited With Patient   Routine Visit Introduction   Continue Visiting No   Patient's Supportive Strategies/Resources  provided emotional support.    Patient Spiritual Encounters   Spiritual Needs

## 2018-09-29 NOTE — H&P
[facility]  History and Physical     Augustina Hinojosa Patient Status:  Emergency    1965 MRN [id number]   Location [facility] [facility] Attending No att. providers found   Hosp Day # 0 PCP Jordyn Trinidad DO     Chief Complaint: drugs.     Family History:   Family History   Problem Relation Age of Onset   • Hypertension Father    • Heart Disorder Father    • Cancer Father 67        PROSTRATE CANCER   • Other (Other) Father         PSORIATIC ARTHRITIS   • Other (Other) Mother times daily as needed for Sleep. Disp: 30 tablet Rfl: 0   LEFLUNOMIDE 20 MG Oral Tab TAKE 1 TABLET BY MOUTH EVERY DAY Disp: 90 tablet Rfl: 1   leflunomide 20 MG Oral Tab Take 1 tablet (20 mg total) by mouth daily.  Disp: 90 tablet Rfl: 1   BACLOFEN 20 MG Or BUN  11   CREATSERUM  0.79   GFRAA  98   GFRNAA  85   CA  9.1   ALB  3.5   NA  137   K  4.3   CL  103   CO2  29.0   ALKPHO  139*   AST  27   ALT  27   BILT  0.3   TP  7.5       Estimated Creatinine Clearance: 77.8 mL/min (based on SCr of 0.79 mg/dL).

## 2018-09-29 NOTE — PROGRESS NOTES
[facility] Note: Antimicrobial Weight Dose Adjustment for: ceftriaxone (Solitario Mu)    Steph Conte is a 46year old female who has been prescribed ceftriaxone (ROCEPHIN) 1 gm x 1 dose.   CrCl is estimated creatinine clearance is 77.8 mL/min (based on SCr

## 2018-09-29 NOTE — ED PROVIDER NOTES
Patient Seen in: [facility]    History   Patient presents with:  Breathing Problem  Swelling Edema (cardiovascular, metabolic)    Stated Complaint: Shortness of Breath, diffculty taking deep breaths    HPI    46year old female air)       Current:/67   Pulse 95   Temp 98 °F (36.7 °C) (Oral)   Resp 21   SpO2 96%         Physical Exam   Constitutional: She is oriented to person, place, and time. She appears well-developed and well-nourished.    HENT:   Head: Normocephalic and

## 2018-09-29 NOTE — ED INITIAL ASSESSMENT (HPI)
Pt to ED c/o shortness of breath. Pt was sent from [facility] for further eval of sudden onset shortness of breath with pink frothy sputum while laying down and having a massage done at 1700 today. EKG and CXR done prior to ED arrival. Pt denies fever.

## 2018-09-30 PROCEDURE — 99225 SUBSEQUENT OBSERVATION CARE: CPT | Performed by: HOSPITALIST

## 2018-09-30 RX ORDER — ASPIRIN 81 MG/1
324 TABLET, CHEWABLE ORAL ONCE
Status: COMPLETED | OUTPATIENT
Start: 2018-10-01 | End: 2018-10-01

## 2018-09-30 RX ORDER — SODIUM CHLORIDE 9 MG/ML
INJECTION, SOLUTION INTRAVENOUS CONTINUOUS
Status: DISCONTINUED | OUTPATIENT
Start: 2018-10-01 | End: 2018-10-01

## 2018-09-30 RX ORDER — FUROSEMIDE 10 MG/ML
20 INJECTION INTRAMUSCULAR; INTRAVENOUS ONCE
Status: COMPLETED | OUTPATIENT
Start: 2018-09-30 | End: 2018-09-30

## 2018-09-30 NOTE — PLAN OF CARE
Problem: CARDIOVASCULAR - ADULT  Goal: Maintains optimal cardiac output and hemodynamic stability  INTERVENTIONS:  - Monitor vital signs, rhythm, and trends  - Monitor for bleeding, hypotension and signs of decreased cardiac output  - Evaluate effectivenes test vs cath on Monday and informed her of the no caffeine rule - she drinks a lot of it.   She stated she will taper herself during the day Sunday so she can hopefully avoid a migraine from the caffeine withdrawal.  She is up ad eduar without difficulty and

## 2018-09-30 NOTE — PROGRESS NOTES
[address] Cardiology  Progress Note    Augustina Marquez Patient Status:  Observation    1965 MRN [id number]   [facility] 2NE-A Attending Yuliet Nation MD   Hosp Day # 0 PCP Beryl Smith DO     Impression:  1.   Dyspnea; etiol accessory muscle use  Abdomen: Soft, non-tender; bowel sounds are normoactive  Extremities: No clubbing/cyanosis; moves all 4 extremities normally      Current Facility-Administered Medications:  furosemide (LASIX) injection 20 mg 20 mg Intravenous Once

## 2018-09-30 NOTE — PROGRESS NOTES
[facility]  Progress Note     Augustina Green Patient Status:  Observation    1965 MRN [id number]   [facility] 2NE-A Attending King Ott MD   Hosp Day # 0 PCP Samuel Jimenez DO     Chief Complaint: sob    S: Patient says LE Oral Daily   • Rizatriptan Benzoate  10 mg Oral Once       ASSESSMENT / PLAN:     1. Sob, infiltrates, interstitial changes-unclear etiology; possible pulm edema; trial of 20mg lasix now; stress test tomorrow; pulm and cards following  2.  Psoriatic arthrit

## 2018-09-30 NOTE — PROGRESS NOTES
[facility] Pulmonary/Critical Care Progress Note     SUBJECTIVE/24H Events: All events, procedures, notes reviewed. Feels better today. Denies dyspnea, rare cough. BLE edema improved.  Has been ambulating without difficu 7.48*   WBC  9.6   PLT  224.0     No results for input(s): BNP in the last 168 hours. Recent Labs   Lab  09/28/18 2040   TROP  <0.046     No results for input(s): PT, INR, PTT in the last 168 hours.   No results for input(s): ABGPHT, MMTQFZ7I, IUZJS0G, A ([facility] only)   Oral Daily   • Rizatriptan Benzoate  10 mg Oral Once     ALPRAZolam, TraZODone HCl, Ketorolac Tromethamine, DiphenhydrAMINE HCl     9/28/18 CTA chest reviewed personally - upon review there are perihilar ground glass opacities noted predominan

## 2018-10-01 ENCOUNTER — APPOINTMENT (OUTPATIENT)
Dept: INTERVENTIONAL RADIOLOGY/VASCULAR | Facility: HOSPITAL | Age: 53
DRG: 189 | End: 2018-10-01
Attending: INTERNAL MEDICINE
Payer: COMMERCIAL

## 2018-10-01 VITALS
SYSTOLIC BLOOD PRESSURE: 126 MMHG | RESPIRATION RATE: 18 BRPM | BODY MASS INDEX: 36.51 KG/M2 | OXYGEN SATURATION: 96 % | TEMPERATURE: 98 F | HEIGHT: 66.5 IN | HEART RATE: 81 BPM | WEIGHT: 229.88 LBS | DIASTOLIC BLOOD PRESSURE: 88 MMHG

## 2018-10-01 PROCEDURE — B2111ZZ FLUOROSCOPY OF MULTIPLE CORONARY ARTERIES USING LOW OSMOLAR CONTRAST: ICD-10-PCS | Performed by: INTERNAL MEDICINE

## 2018-10-01 PROCEDURE — 99239 HOSP IP/OBS DSCHRG MGMT >30: CPT | Performed by: INTERNAL MEDICINE

## 2018-10-01 PROCEDURE — B2151ZZ FLUOROSCOPY OF LEFT HEART USING LOW OSMOLAR CONTRAST: ICD-10-PCS | Performed by: INTERNAL MEDICINE

## 2018-10-01 PROCEDURE — B2161ZZ FLUOROSCOPY OF RIGHT AND LEFT HEART USING LOW OSMOLAR CONTRAST: ICD-10-PCS | Performed by: INTERNAL MEDICINE

## 2018-10-01 PROCEDURE — 4A023N8 MEASUREMENT OF CARDIAC SAMPLING AND PRESSURE, BILATERAL, PERCUTANEOUS APPROACH: ICD-10-PCS | Performed by: INTERNAL MEDICINE

## 2018-10-01 RX ORDER — HEPARIN SODIUM 5000 [USP'U]/ML
INJECTION, SOLUTION INTRAVENOUS; SUBCUTANEOUS
Status: COMPLETED
Start: 2018-10-01 | End: 2018-10-01

## 2018-10-01 RX ORDER — MIDAZOLAM HYDROCHLORIDE 1 MG/ML
INJECTION INTRAMUSCULAR; INTRAVENOUS
Status: COMPLETED
Start: 2018-10-01 | End: 2018-10-01

## 2018-10-01 RX ORDER — LIDOCAINE HYDROCHLORIDE 10 MG/ML
INJECTION, SOLUTION EPIDURAL; INFILTRATION; INTRACAUDAL; PERINEURAL
Status: COMPLETED
Start: 2018-10-01 | End: 2018-10-01

## 2018-10-01 NOTE — PROGRESS NOTES
[address] [facility] Cardiology  Progress Note    Augustina Davalos Patient Status:  Observation    1965 MRN [id number]   [facility] 2NE-A Attending Cruz Saleh MD   Hosp Day # 1 PCP Jose Grier DO     Impression:  1.   Dyspnea; etiol murmurs/rubs/gallops are appreciated  Lungs: Clear to auscultation bilaterally; no accessory muscle use  Abdomen: Soft, non-tender; bowel sounds are normoactive  Extremities: No clubbing/cyanosis; moves all 4 extremities normally      Current Facility[facility]

## 2018-10-01 NOTE — PLAN OF CARE
DC instructions and post cath instructions given. Follow up appointments and testing discussed. IV removed. Questions answered. Pt escorted to [employer] via wheelchair.

## 2018-10-01 NOTE — PLAN OF CARE
Received pt back from cath lab around 10:30 AM. A/O, slightly drowsy, RA. VSS. Denies pain Post cath assessment ongoing. Groin remains soft. No signs of bleeding. Will continue to monitor.

## 2018-10-01 NOTE — PAYOR COMM NOTE
--------------  ADMISSION REVIEW     Payor: Trupti FRANK [employer]  Subscriber #:  [id number]  Authorization Number: [id number]    Admit date: 9/30/18  Admit time: 2049     Admitting Physician: Reshma Norwood MD  Attending Physician:  Jose Manuel Tafoya MD  [facility] Patient presents with:  Dyspnea MEHNAZ SOB (respiratory)    Stated Complaint: mehnaz pt states she has fluid on her lungs    HPI  CHIEF COMPLAINT: Dyspnea on exertion, orthopnea, bilateral lower extremity swelling     HISTORY OF PRESENT ILLNESS: Patient is a 5 arthritis [facility])    • Thyroid cancer [facility]) 1994    thyroidectomy     Past Surgical History:  1968: ADENOIDECTOMY  1/1/2006: ARTHROSCOPY OF JOINT UNLISTED      Comment:  Knee Arthroscopy  1/1/1989: ORAL SURGERY PROCEDURE      Comment:  Tooth Extraction, \" w following components:       Result Value    Glucose 112 (*)     Alkaline Phosphatase 139 (*)     A/G Ratio 0.9 (*)     All other components within normal limits   PRO BETA NATRIURETIC PEPTIDE - Abnormal; Notable for the following components:    Pro-Beta Na radiology and laboratory results with the patient. I discussed the diagnosis and admission for further evaluation and care. Patient states they understand diagnosis and admission and agree with admission and plan.  I answered all of the patient's questions not lie flat and she could not raise. She also felt she had frothy sputum. She denies history of DVT or PE any recent travel or sick contacts. She has history of such psoriatic arthritis and she has been on multiple medicines.   She denies history of int BACLOFEN 20 MG Oral Tab TAKE 1 TABLET BY MOUTH EVERY EVENING Disp: 90 tablet Rfl: 1   METFORMIN  MG Oral Tab TAKE 1 TABLET BY MOUTH TWICE DAILY WITH MEALS Disp: 180 tablet Rfl: 2   Cholecalciferol (VITAMIN D) 2000 units Oral Cap Take by mouth.  Dis Imaging data reviewed in Epic.     ASSESSMENT / PLAN:     3 46years old woman with sudden onset shortness of breath  -No cardiac history normal 2D echo 2 years ago  -On multiple medicines for her psoriatic arthritis  -Groundglass perihilar interstitial in Jb Villalba RN          REVIEWER COMMENTS:     FOR REVIEW/APPROVAL OF INPT ADMISSION    PLEASE FAX ALL INPT DAYS AS AUTHORIZED ALONG W/NRD

## 2018-10-01 NOTE — PAYOR COMM NOTE
--------------  CONTINUED STAY REVIEW    Payor: [facility] Paulino [facility]  Subscriber #:  [id number]  Authorization Number: [id number]    Admit date: 9/30/18  Admit time: 2049    Admitting Physician: Lucero Leal MD  Attending Physician:  Stanley Fleischer, MD worsening BLE edema over past several weeks. 9/28/18 CTA chest reviewed personally - upon review there are perihilar ground glass opacities noted predominantly in the bilateral lower lobes as well as some in the upper lobes.  No LAD   ASSESSMENT  · Dyspn care discussed with patient and rn  Dorian Boss MD    9/30/18:  Temp:  [97.6 °F (36.4 °C)-98.7 °F (37.1 °C)] 98.7 °F (37.1 °C)  Pulse:  [73-86] 86  Resp:  [15-20] 18  BP: (108-140)/(62-82) 133/81    Patient says LE edema and dyspnea are better today; no mildly elevated.   2.  Bilateral upper lobe infiltrates  3.  HTN  Plan:  1.  Echocardiogram reviewed->unremarkable. 2.  Pulmonology recommendations noted. It is felt that patient's clinical picture is compatible with flash pulmonary edema.  I recommend can Hanna Resendiz RN      Pantoprazole Sodium (PROTONIX) EC tab 40 mg     Date Action Dose Route User    10/1/2018 0655 Given 40 mg Oral Hanna Fraire RN          FOR CONTINUED REVIEW/APPROVAL OF INPT ADMISSION    PLEASE FAX ALL INPT DAYS AS AUTHORIZED ALONG

## 2018-10-01 NOTE — PROGRESS NOTES
[facility]PITAL[facility]  Progress Note     Augustina Zapien Patient Status:  Observation    1965 MRN [id number]   [facility] 2NE-A Attending Manuel Hopkins MD   Hosp Day # 1 PCP Aleksandra Mccauley DO     Chief Complaint: sob    S: s/p cath today. Imaging:   CT ANGIOGRAPHY, CHEST (CPT=71275)     COMPARISON:  None. INDICATIONS:  mehnaz pt states she has fluid on her lungs     TECHNIQUE:  IV contrast-enhanced multislice CT angiography is performed through the pulmonary arterial anatomy.  3D No pleural effusion. 2.  No CT evidence for pulmonary embolism.            Dictated by: Naresh Sanchez MD on 9/28/2018 at 22:34         Medications:   • baclofen  20 mg Oral BID   • leflunomide  20 mg Oral Daily   • Levothyroxine Sodium  100 mcg Oral B DO in 1 week    Ángela Malin MD  10/1/2018

## 2018-10-01 NOTE — PROCEDURES
[facility]    PATIENT'S NAME: Gayle Hammonds LVZ   ATTENDING PHYSICIAN: Mary Fu M.D. OPERATING PHYSICIAN: Loyd Mata M.D.    PATIENT ACCOUNT#:   [de-identified]    LOCATION:  [address] [place] [address]  MEDICAL RECORD #:   [id number]       DATE OF BIRTH remainder of the LAD shows nonobstructive plaquing in its midportion. At one location the artery appears to be narrowed by perhaps 30%. The right coronary artery is a medium-sized anatomically dominant vessel.   It gives rise to a small PDA and small post

## 2018-10-01 NOTE — PROGRESS NOTES
[facility]  Progress Note    Augustina Lin Headings Patient Status:  Inpatient    1965 MRN [id number]   [facility] 2NE-A Attending Ilia Mcclelland MD   [facility] Day # 1 PCP Mirlande Moseley DO     ASSESSMENT     · Dyspnea, chest discomfort, cou Acute pulmonary edema (HCC)     Pulmonary infiltrate      Subjective:  Augustina Campos is a(n) 46year old female.    no complaisn  Feeling better   Objective:  Oxygen Therapy  SpO2: 99 %  O2 Device: None (Room air)  O2 Flow Rate (L/min): 2 L/min  Pulse Oxim ALPRAZolam (XANAX) tab 0.25 mg 0.25 mg Oral BID PRN   baclofen (LIORESAL) tab 20 mg 20 mg Oral BID   leflunomide (ARAVA) tab 20 mg 20 mg Oral Daily   Levothyroxine Sodium (SYNTHROID) tab 100 mcg 100 mcg Oral Before breakfast   cetirizine (ZYRTEC) tab 10

## 2018-10-01 NOTE — PROCEDURES
[facility] Cardiology      Procedure:  RHC, LHC, CORONARY ANGIO, LV ANGIO,                          PERCLOSE RFA      Findings    LVEF: 70%    LM: NORMAL    LCx: PLAQUE    LAD: PLAQUE    RCA: PLAQUE    RA = 4    PA = 32/10, 17    PCW= 6         RECOMMEND:    HT

## 2018-10-01 NOTE — DISCHARGE SUMMARY
[facility]  Discharge Summary    [address] [facility] Patient Status:  Inpatient    1965 MRN [id number]   [facility] 2NE-A Attending Cortez Galaviz MD   [facility] Day # 1 PCP Dutch Cooper DO     Date of Admission: 2018    Date of [address] [facility] pain fever chills any weight gain or loss any nausea vomiting abdominal pain hematuria dysuria frequency fever chills.   Please refer to history and physical done by Dr. Steve Moreno for details of admission      Brief Synopsis:   Workup included CT angiogram of t IMPRESSION:    1. Normal right and left ventricular hemodynamics. 2.     Normal pulmonary artery pressure. 3.     Normal left ventricular size and systolic function.   4.     Mild coronary artery disease   Cardiology recommended blood pressure contr 2-3months by pulmonary, need complete PFTs performed as outpatient with pulmonary. Follow up with pulmonary as outpatient for further workup on this as outpatient.     Patient discharged in stable condition and advised follow-up with pulmonary as outpatien Refills:  0     Melatonin 3 MG Caps      Take  by mouth. Refills:  0     Meloxicam 15 MG Tabs      TAKE 1 TABLET BY MOUTH DAILY WITH FOOD AND WATER. STOP IF THERE IS GASTROINTESTINAL UPSET.    Quantity:  30 tablet  Refills:  3     MetFORMIN HCl 850 MG Tab clear to auscultation bilaterally  Heart: S1, S2 normal, no murmur,  regular rate and rhythm  Abdomen: soft, non-tender; bowel sounds normal  Extremities: extremities normal,no cyanosis or edema  Pulses: 2+ and symmetric  Skin: Skin color, texture, turgor

## 2018-10-02 ENCOUNTER — TELEPHONE (OUTPATIENT)
Dept: FAMILY MEDICINE CLINIC | Facility: CLINIC | Age: 53
End: 2018-10-02

## 2018-10-02 ENCOUNTER — PATIENT OUTREACH (OUTPATIENT)
Dept: CASE MANAGEMENT | Age: 53
End: 2018-10-02

## 2018-10-02 DIAGNOSIS — Z02.9 ENCOUNTERS FOR ADMINISTRATIVE PURPOSE: ICD-10-CM

## 2018-10-02 NOTE — PROGRESS NOTES
Initial Post Discharge Follow Up   Discharge Date: 10/1/18  Contact Date: 10/2/2018    Consent Verification:  Assessment Completed With: Patient   HIPAA Verified?   Yes    Discharge Dx:     Shortness of breath and hypoxia on admission with groundglass pe before breakfast. Disp: 90 tablet Rfl: 0   Fluticasone Propionate 50 MCG/ACT Nasal Suspension 1 spray by Nasal route 2 (two) times daily.  Disp: 3 Bottle Rfl: 0   LISINOPRIL-HYDROCHLOROTHIAZIDE 20-25 MG Oral Tab TAKE 1 TABLET BY MOUTH ONCE DAILY Disp: 90 ta CHF, Stroke, PT, OT, Speech, Other: [address] - sleep study   Have you scheduled these services?    no    If no, do you need help with this?    no        Needs post D/C:   Now that you are home, are there any needs or concerns you need addressed before

## 2018-10-02 NOTE — TELEPHONE ENCOUNTER
Spoke with pt today for TCM. Pt d/c from hospital as of 10/01/18 w/ dx pulmonary edema and dyspnea. Pt requesting a TCM HFU appt preferably 4:30pm or later if possible.   Pt is moderate risk for readmission and would benefit from a TCM HFU appt by farzade

## 2018-10-04 ENCOUNTER — PATIENT OUTREACH (OUTPATIENT)
Dept: FAMILY MEDICINE CLINIC | Facility: CLINIC | Age: 53
End: 2018-10-04

## 2018-10-05 NOTE — PAYOR COMM NOTE
--------------  DISCHARGE REVIEW    Payor: Aliza Nielson [facility]  Subscriber #:  [id number]  Authorization Number: [id number]    Admit date: 9/28/18  Admit time:  2049  Discharge Date: 10/1/2018  4:52 PM     Admitting Physician: Romeo Montemayor MD  Attendin Present Illness:   Norma Eaton is a 46year old female with sudden onset shortness of breath while she was getting a massage today patient states that she had massage on her back and when she turned over she felt she could not lie flat and she could not artery is a large vessel. It gives rise to a large obtuse marginal artery, medium-sized, posterior left ventricular branch. The circumflex artery system is normal.  The LAD is a medium-sized vessel. It gives rise to a several small diagonal branches.   Saleem Diehl L by nasal cannula and then off oxygen since 8:47 PM at night and remained off oxygen, currently weaned off oxygen and saturating 96 to 99 % on room air  · Patient improved symptomatically.     · Follow up with pulmonary for further pulm work up as OP- Per route 2 (two) times daily. Quantity:  3 Bottle  Refills:  0     leflunomide 20 MG Tabs  Commonly known as:  ARAVA      Take 1 tablet (20 mg total) by mouth daily.    Quantity:  90 tablet  Refills:  1     leflunomide 20 MG Tabs  Commonly known as:  Rodolfo Odor 229 lb 14.4 oz (104.3 kg)   SpO2 95%   BMI 36.55 kg/m²        General appearance: alert and cooperative  Head: Normocephalic, without obvious abnormality, atraumatic  Throat: oral mucosa moist  Neck: no adenopathy, no carotid bruit, no JVD  Lungs: clear to

## 2018-10-08 ENCOUNTER — OFFICE VISIT (OUTPATIENT)
Dept: FAMILY MEDICINE CLINIC | Facility: CLINIC | Age: 53
End: 2018-10-08
Payer: COMMERCIAL

## 2018-10-08 VITALS
SYSTOLIC BLOOD PRESSURE: 130 MMHG | HEART RATE: 91 BPM | WEIGHT: 237 LBS | HEIGHT: 66.5 IN | DIASTOLIC BLOOD PRESSURE: 76 MMHG | TEMPERATURE: 98 F | OXYGEN SATURATION: 98 % | RESPIRATION RATE: 18 BRPM | BODY MASS INDEX: 37.64 KG/M2

## 2018-10-08 DIAGNOSIS — R93.1 ABNORMAL ECHOCARDIOGRAM: ICD-10-CM

## 2018-10-08 DIAGNOSIS — I10 ESSENTIAL HYPERTENSION: ICD-10-CM

## 2018-10-08 DIAGNOSIS — F41.9 ANXIETY: ICD-10-CM

## 2018-10-08 DIAGNOSIS — J81.1 CHRONIC PULMONARY EDEMA: Primary | ICD-10-CM

## 2018-10-08 DIAGNOSIS — E78.00 PURE HYPERCHOLESTEROLEMIA: ICD-10-CM

## 2018-10-08 DIAGNOSIS — L40.50 PSORIATIC ARTHROPATHY (HCC): ICD-10-CM

## 2018-10-08 PROBLEM — I25.10 CORONARY ARTERY DISEASE INVOLVING NATIVE CORONARY ARTERY OF NATIVE HEART WITHOUT ANGINA PECTORIS: Status: ACTIVE | Noted: 2018-10-08

## 2018-10-08 PROCEDURE — 99496 TRANSJ CARE MGMT HIGH F2F 7D: CPT | Performed by: FAMILY MEDICINE

## 2018-10-08 RX ORDER — POTASSIUM CHLORIDE 1500 MG/1
20 TABLET, FILM COATED, EXTENDED RELEASE ORAL DAILY
Qty: 30 TABLET | Refills: 0 | Status: SHIPPED | OUTPATIENT
Start: 2018-10-08 | End: 2018-10-10 | Stop reason: RX

## 2018-10-08 RX ORDER — ALPRAZOLAM 0.25 MG/1
0.25 TABLET ORAL 2 TIMES DAILY PRN
Qty: 30 TABLET | Refills: 0 | Status: SHIPPED | OUTPATIENT
Start: 2018-10-08 | End: 2019-03-20

## 2018-10-08 RX ORDER — LISINOPRIL 20 MG/1
20 TABLET ORAL DAILY
Qty: 30 TABLET | Refills: 1 | Status: SHIPPED | OUTPATIENT
Start: 2018-10-08 | End: 2018-12-08

## 2018-10-08 RX ORDER — FUROSEMIDE 20 MG/1
20 TABLET ORAL 2 TIMES DAILY
Qty: 30 TABLET | Refills: 0 | Status: SHIPPED | OUTPATIENT
Start: 2018-10-08 | End: 2018-10-22

## 2018-10-08 NOTE — PATIENT INSTRUCTIONS
Please follow up with     [facility] Cardiology- Office: [phone]- make appt in next 10 days.    ?  Dr. Cherri Pacheco

## 2018-10-08 NOTE — PROGRESS NOTES
HPI:    Suleman Up is a 46year old female here today for hospital follow up. She was discharged from Inpatient hospital, [facility] to Home   Admit Date: 9/28/2018  Discharge Date: 10/1/2018  Hospital Discharge Diagnosis: 1. Shortness of br intermittent shortness of breath over the past 1-2 years that worsened acutely on 9/28/2018 while she was obtaining a massage and lying prone.   As she turned over she felt intense pressure on her chest and was coughing up white sputum from the back of her cardiology. Currently denies headache, fever, vision changes, nausea, vomiting, dizziness denied fevers, rash, headaches, or other complaints.       Allergies:  She is allergic to levaquin and seasonal.    Current Meds:    Current Outpatient Medications on (6/29/2012), OSTEOARTHRITIS, OTHER DISEASES, Psoriatic arthritis ([facility] [address] and Thyroid cancer ([facility] [address] (1994).     She  has a past surgical history that includes oral surgery procedure (1/1/1989); arthroscopy of joint unlisted (1/1/2006); tonsillectomy (1968); ad GENERAL: well developed, well nourished, in no apparent distress  SKIN: no rashes, no suspicious lesions  HEENT: atraumatic, normocephalic, ears and throat are clear, no JVD  EYES: PERRLA, EOMI, conjunctiva are clear  NECK: supple, no adenopathy, no brui daily  Repeat BMP in 3 days check blood pressure at work daily and call if left less than 719 systolic    Psoriatic arthropathy ([facility] [address]   Keep appointment with Dr. Roderic Primrose    Pure hypercholesterolemia   Complete Lipid panel   FU with cardiology- in next 2 weeks

## 2018-10-08 NOTE — TELEPHONE ENCOUNTER
PT requesting a refill of pantoprazole, no protocol. Unable to approve.     LOV 6/18/18  LF 7/17/18 #90 with 0 refills    Future Appointments   Date Time Provider Dyan Gutiérrez   10/8/2018  6:00 PM Karen Fagan DO [facility] [place]   10/16/2018  5:00 P

## 2018-10-09 RX ORDER — PANTOPRAZOLE SODIUM 40 MG/1
TABLET, DELAYED RELEASE ORAL
Qty: 90 TABLET | Refills: 1 | Status: SHIPPED | OUTPATIENT
Start: 2018-10-09 | End: 2019-07-17

## 2018-10-10 RX ORDER — POTASSIUM CHLORIDE 20 MEQ/1
20 TABLET, EXTENDED RELEASE ORAL DAILY
Qty: 30 TABLET | Refills: 0 | Status: SHIPPED | OUTPATIENT
Start: 2018-10-10 | End: 2019-10-15

## 2018-10-18 DIAGNOSIS — E89.0 POSTOPERATIVE HYPOTHYROIDISM: ICD-10-CM

## 2018-10-18 RX ORDER — LEVOTHYROXINE SODIUM 112 UG/1
TABLET ORAL
Qty: 90 TABLET | Refills: 1 | Status: SHIPPED | OUTPATIENT
Start: 2018-10-18 | End: 2019-05-30

## 2018-10-18 NOTE — TELEPHONE ENCOUNTER
Pt requesting a refill of levothyroxine, protocol passed. Refill approved.     LOV 10/8/18  LF 7/20/18 #90 with 0 refills    Future Appointments   Date Time Provider Dyan Gutiérrez   10/18/2018  5:20 PM Marilia Anderson MD [facility]   11/5/2018  4:

## 2018-10-22 ENCOUNTER — APPOINTMENT (OUTPATIENT)
Dept: HEMATOLOGY/ONCOLOGY | Age: 53
End: 2018-10-22
Attending: FAMILY MEDICINE
Payer: COMMERCIAL

## 2018-10-22 DIAGNOSIS — J81.1 CHRONIC PULMONARY EDEMA: ICD-10-CM

## 2018-10-22 PROCEDURE — 80048 BASIC METABOLIC PNL TOTAL CA: CPT

## 2018-10-22 PROCEDURE — 36415 COLL VENOUS BLD VENIPUNCTURE: CPT

## 2018-10-22 RX ORDER — FUROSEMIDE 20 MG/1
TABLET ORAL
Qty: 60 TABLET | Refills: 0 | Status: SHIPPED | OUTPATIENT
Start: 2018-10-22 | End: 2018-11-20

## 2018-10-22 NOTE — TELEPHONE ENCOUNTER
Pt requesting refill on Furosemide, protocol passed, refill approved    LOV 10/8/18    LF 10/8/18    Future Appointments   Date Time Provider Dyan Gutiérrez   11/5/2018  4:40 PM Mayelin Valentin DO [facility] [place]   12/18/2018  8:20 AM Mayelin Valentin DO

## 2018-10-26 ENCOUNTER — TELEPHONE (OUTPATIENT)
Dept: FAMILY MEDICINE CLINIC | Facility: CLINIC | Age: 53
End: 2018-10-26

## 2018-10-26 DIAGNOSIS — M54.50 BILATERAL LOW BACK PAIN WITHOUT SCIATICA, UNSPECIFIED CHRONICITY: ICD-10-CM

## 2018-10-26 RX ORDER — BACLOFEN 20 MG/1
TABLET ORAL
Qty: 90 TABLET | Refills: 1 | Status: SHIPPED | OUTPATIENT
Start: 2018-10-26 | End: 2019-04-18

## 2018-10-26 NOTE — TELEPHONE ENCOUNTER
LMOM to return call to the office. Provided pt office phone [phone] along with office hours given. Notes recorded by Re Soto DO on 10/24/2018 at 6:11 PM CDT  Calcium is low.  Needs confirmation.  Repeat LAB-ionized calcium. Pérez Chavira call

## 2018-10-26 NOTE — TELEPHONE ENCOUNTER
Pt requesting refill of Baclofen, no protocol , unable to approve:     Last Time Medication was Filled:  4/23/18    Last Office Visit with PCP: 10/8/18    Future Appointments   Date Time Provider Dyan Gutiérrez   11/5/2018  4:40 PM Onesimo Gomez DO EM

## 2018-10-29 NOTE — TELEPHONE ENCOUNTER
Spoke with pt, she is aware of her Calcium level and knows she need sto be better about taking supplement.  Pt declines further testing at this time

## 2018-11-02 DIAGNOSIS — E89.0 POSTOPERATIVE HYPOTHYROIDISM: ICD-10-CM

## 2018-11-02 RX ORDER — LEVOTHYROXINE SODIUM 112 UG/1
TABLET ORAL
Qty: 90 TABLET | Refills: 0 | OUTPATIENT
Start: 2018-11-02

## 2018-11-02 NOTE — TELEPHONE ENCOUNTER
Spoke with pharmacy, RX from 10/18/18 will be filled, duplicate RX denied    Future Appointments   Date Time Provider Dyan Gutiérrez   12/18/2018  8:20 AM Brendon Platt DO [facility] [place]

## 2018-11-18 DIAGNOSIS — F33.42 RECURRENT MAJOR DEPRESSIVE DISORDER, IN FULL REMISSION (HCC): ICD-10-CM

## 2018-11-18 DIAGNOSIS — F41.9 ANXIETY: ICD-10-CM

## 2018-11-19 RX ORDER — VENLAFAXINE HYDROCHLORIDE 150 MG/1
150 CAPSULE, EXTENDED RELEASE ORAL
Qty: 90 CAPSULE | Refills: 0 | Status: SHIPPED | OUTPATIENT
Start: 2018-11-19 | End: 2019-02-13

## 2018-11-19 NOTE — TELEPHONE ENCOUNTER
Pt requesting refill of Venlafaxine, no protocol  unable to approve:     Last Time Medication was Filled:  8/3/18    Last Office Visit with PCP: 10/8/18    Future Appointments   Date Time Provider Dyan Gutiérrez   12/18/2018  8:20 AM Karen Fagan DO

## 2018-11-20 DIAGNOSIS — J81.1 CHRONIC PULMONARY EDEMA: ICD-10-CM

## 2018-11-20 RX ORDER — FUROSEMIDE 20 MG/1
TABLET ORAL
Qty: 60 TABLET | Refills: 0 | Status: SHIPPED | OUTPATIENT
Start: 2018-11-20 | End: 2018-12-28

## 2018-11-30 ENCOUNTER — NURSE ONLY (OUTPATIENT)
Dept: HEMATOLOGY/ONCOLOGY | Age: 53
End: 2018-11-30
Attending: FAMILY MEDICINE
Payer: COMMERCIAL

## 2018-11-30 DIAGNOSIS — Z00.00 ANNUAL PHYSICAL EXAM: ICD-10-CM

## 2018-11-30 DIAGNOSIS — Z13.6 SCREENING FOR HEART DISEASE: ICD-10-CM

## 2018-11-30 PROCEDURE — 36415 COLL VENOUS BLD VENIPUNCTURE: CPT

## 2018-11-30 PROCEDURE — 80061 LIPID PANEL: CPT

## 2018-12-08 DIAGNOSIS — J81.1 CHRONIC PULMONARY EDEMA: ICD-10-CM

## 2018-12-10 RX ORDER — LISINOPRIL 20 MG/1
TABLET ORAL
Qty: 90 TABLET | Refills: 0 | Status: SHIPPED | OUTPATIENT
Start: 2018-12-10 | End: 2019-09-16

## 2018-12-10 NOTE — TELEPHONE ENCOUNTER
Pt requesting refill on Lisinopril, protocol passed, refill approved    LOV 10/8/18    LF 10/8/18    Future Appointments   Date Time Provider Dyan Gutiérrez   12/18/2018  8:20 AM Eliot Christie,  [facility] [place]

## 2018-12-18 ENCOUNTER — OFFICE VISIT (OUTPATIENT)
Dept: FAMILY MEDICINE CLINIC | Facility: CLINIC | Age: 53
End: 2018-12-18
Payer: COMMERCIAL

## 2018-12-18 VITALS
SYSTOLIC BLOOD PRESSURE: 124 MMHG | OXYGEN SATURATION: 97 % | TEMPERATURE: 98 F | HEIGHT: 66.5 IN | RESPIRATION RATE: 20 BRPM | BODY MASS INDEX: 36.53 KG/M2 | WEIGHT: 230 LBS | DIASTOLIC BLOOD PRESSURE: 80 MMHG | HEART RATE: 90 BPM

## 2018-12-18 DIAGNOSIS — F33.42 RECURRENT MAJOR DEPRESSIVE DISORDER, IN FULL REMISSION (HCC): ICD-10-CM

## 2018-12-18 DIAGNOSIS — Z00.00 ANNUAL PHYSICAL EXAM: Primary | ICD-10-CM

## 2018-12-18 DIAGNOSIS — Z13.228 SCREENING FOR ENDOCRINE, NUTRITIONAL, METABOLIC AND IMMUNITY DISORDER: ICD-10-CM

## 2018-12-18 DIAGNOSIS — Z12.39 SCREENING FOR MALIGNANT NEOPLASM OF BREAST: ICD-10-CM

## 2018-12-18 DIAGNOSIS — Z13.0 SCREENING FOR IRON DEFICIENCY ANEMIA: ICD-10-CM

## 2018-12-18 DIAGNOSIS — Z13.29 SCREENING FOR ENDOCRINE, NUTRITIONAL, METABOLIC AND IMMUNITY DISORDER: ICD-10-CM

## 2018-12-18 DIAGNOSIS — R60.0 BILATERAL LEG EDEMA: ICD-10-CM

## 2018-12-18 DIAGNOSIS — Z12.4 SCREENING FOR CERVICAL CANCER: ICD-10-CM

## 2018-12-18 DIAGNOSIS — E78.00 PURE HYPERCHOLESTEROLEMIA: ICD-10-CM

## 2018-12-18 DIAGNOSIS — E89.0 POSTOPERATIVE HYPOTHYROIDISM: ICD-10-CM

## 2018-12-18 DIAGNOSIS — Z13.21 SCREENING FOR ENDOCRINE, NUTRITIONAL, METABOLIC AND IMMUNITY DISORDER: ICD-10-CM

## 2018-12-18 DIAGNOSIS — Z13.0 SCREENING FOR ENDOCRINE, NUTRITIONAL, METABOLIC AND IMMUNITY DISORDER: ICD-10-CM

## 2018-12-18 DIAGNOSIS — L40.50 PSORIATIC ARTHRITIS (HCC): ICD-10-CM

## 2018-12-18 PROCEDURE — 88175 CYTOPATH C/V AUTO FLUID REDO: CPT | Performed by: FAMILY MEDICINE

## 2018-12-18 PROCEDURE — 87624 HPV HI-RISK TYP POOLED RSLT: CPT | Performed by: FAMILY MEDICINE

## 2018-12-18 PROCEDURE — 99396 PREV VISIT EST AGE 40-64: CPT | Performed by: FAMILY MEDICINE

## 2018-12-18 RX ORDER — BUPROPION HYDROCHLORIDE 300 MG/1
300 TABLET ORAL
Refills: 3 | COMMUNITY
Start: 2018-10-26 | End: 2019-07-08

## 2018-12-18 RX ORDER — POTASSIUM CHLORIDE 750 MG/1
10 TABLET, FILM COATED, EXTENDED RELEASE ORAL 2 TIMES DAILY
Qty: 60 TABLET | Refills: 5 | Status: SHIPPED | OUTPATIENT
Start: 2018-12-18 | End: 2019-04-30

## 2018-12-18 NOTE — PROGRESS NOTES
REASON FOR VISIT:    Bia Hayes is a 46year old female who presents for an [address]. Doing well no shortness of breath or chest tightness or leg swelling. Using Lasix daily.   Has not started on potassium chloride that was prescribed LISINOPRIL 20 MG Oral Tab TAKE 1 TABLET BY MOUTH EVERY DAY Disp: 90 tablet Rfl: 0   FUROSEMIDE 20 MG Oral Tab TAKE 1 TABLET BY MOUTH TWICE A DAY Disp: 60 tablet Rfl: 0   VENLAFAXINE HCL  MG Oral Capsule SR 24 Hr TAKE 1 CAPSULE (150 MG TOTAL) BY MOUTH 10/01/18 : 229 lb 14.4 oz  07/09/18 : 240 lb  06/18/18 : 243 lb 12.8 oz    Body mass index is 36.57 kg/m².     No results found for: GLUCOSE  Lab Results   Component Value Date    CHOLEST 246 (H) 11/30/2018    CHOLEST 179 07/11/2012    CHOLEST 195 08/09/201 INDICATIONS AND SCHEDULE Recommendation Internal Lab or Procedure External Lab or Procedure   Breast Cancer Screening   Every 2 yrs age 54-69 Mammogram due on 11/27/2018    Pap Every 3 yrs age 21-65 or Pap and HPV every 5 yrs age 33-67 Pap Smear,2 Years du Medications (ACE/ARB, digoxin, diuretics)    Potassium  Annually Potassium (mmol/L)   Date Value   10/22/2018 4.1     POTASSIUM (mmol/L)   Date Value   04/29/2014 3.8    No flowsheet data found.     Creatinine  Annually CREATININE (mg/dL)   Date Value   04/ Pantoprazole Sodium 40 MG Oral Tab EC TAKE 1 TABLET BY MOUTH EVERY MORNING BEFORE BREAKFAST Disp: 90 tablet Rfl: 1   ALPRAZolam 0.25 MG Oral Tab Take 1 tablet (0.25 mg total) by mouth 2 (two) times daily as needed for Sleep.  Disp: 30 tablet Rfl: 0   TRAZOD • Cancer Father 67        PROSTRATE CANCER   • Other (Other) Father         PSORIATIC ARTHRITIS   • Other (Other) Mother         DEPRESSION AND ANXIETY   • Cancer Maternal Grandmother         COLON CANCER   • Other (Other) Maternal Grandmother         DEPR BREAST: no dominant or suspicious mass- axilla clear bilateral  LUNGS: clear to auscultation  CARDIO: RRR without murmur  GI: good BS's, no masses, HSM or tenderness  : NL introitus. Scant discharge in vaginal vault. Normal cervix. .  Normal adnexa and o -contracts for safety- if suicidal or homicidal, call 911 or go to nearest ER and call office  -increased suicide risk on SSRI or SNRI in past  Exercise 3 x weekly x 30-60min  - BuPROPion HCl ER, XL, 300 MG Oral Tablet 24 Hr;  Take 300 mg by mouth once saulo

## 2018-12-24 ENCOUNTER — TELEPHONE (OUTPATIENT)
Dept: FAMILY MEDICINE CLINIC | Facility: CLINIC | Age: 53
End: 2018-12-24

## 2018-12-24 NOTE — TELEPHONE ENCOUNTER
Spoke with pt, reviewed results and recommendations, pt verbalized understanding    Notes recorded by Cornelius Barboza DO on 12/20/2018 at 7:59 PM CST  Normal Pap smear and HPV.  Repeat in 3 years.  Mychart notified.

## 2018-12-26 ENCOUNTER — TELEPHONE (OUTPATIENT)
Dept: FAMILY MEDICINE CLINIC | Facility: CLINIC | Age: 53
End: 2018-12-26

## 2018-12-26 DIAGNOSIS — J81.0 ACUTE PULMONARY EDEMA (HCC): Primary | ICD-10-CM

## 2018-12-26 NOTE — TELEPHONE ENCOUNTER
Provider Address Phone   MD MARGARITO Downey [phone]      Electronically Signed By: Robert Riddle DO    Order Date: Dec 26, 2018 at  3:45 PM     Asymptomatic on lasix  Etiology is unclear  Question on CT

## 2018-12-26 NOTE — TELEPHONE ENCOUNTER
Informed pt pf MD recommendations.  Pt states will call central scheduling to make appt for CT and will follow up with Dr Pavel Woods

## 2018-12-28 DIAGNOSIS — J81.1 CHRONIC PULMONARY EDEMA: ICD-10-CM

## 2018-12-28 RX ORDER — FUROSEMIDE 20 MG/1
20 TABLET ORAL 2 TIMES DAILY
Qty: 180 TABLET | Refills: 0 | Status: SHIPPED | OUTPATIENT
Start: 2018-12-28 | End: 2019-03-25

## 2018-12-28 NOTE — TELEPHONE ENCOUNTER
PT requesting a refill of furosemide, protocol passed. Refill approved.     LOV with PCP 12/18/18    LF 11/20/18 #60    Future Appointments   Date Time Provider Dyan Gutiérrez   2/14/2019  4:45 PM Caitlin Green, DO [facility] [address]

## 2019-01-18 DIAGNOSIS — E28.2 POLYCYSTIC OVARIES: ICD-10-CM

## 2019-01-18 NOTE — TELEPHONE ENCOUNTER
PT requesting a refil of metformin, protocol failed. Unable to approve.     LOV with PCP 12/18/18    LF 4/23/18 #180 with 2 refill    Future Appointments   Date Time Provider Dyan Gutiérrez   1/30/2019  6:45 AM PF CT RM1 PF CT [place]   1/30/2019  7:4

## 2019-01-30 ENCOUNTER — HOSPITAL ENCOUNTER (OUTPATIENT)
Dept: MAMMOGRAPHY | Age: 54
Discharge: HOME | End: 2019-01-30
Attending: FAMILY MEDICINE
Payer: COMMERCIAL

## 2019-01-30 DIAGNOSIS — Z00.00 ANNUAL PHYSICAL EXAM: ICD-10-CM

## 2019-01-30 DIAGNOSIS — Z12.39 SCREENING FOR MALIGNANT NEOPLASM OF BREAST: ICD-10-CM

## 2019-01-30 PROCEDURE — 77067 SCR MAMMO BI INCL CAD: CPT | Performed by: FAMILY MEDICINE

## 2019-01-30 PROCEDURE — 77063 BREAST TOMOSYNTHESIS BI: CPT | Performed by: FAMILY MEDICINE

## 2019-02-11 DIAGNOSIS — F41.9 ANXIETY: ICD-10-CM

## 2019-02-11 DIAGNOSIS — F33.42 RECURRENT MAJOR DEPRESSIVE DISORDER, IN FULL REMISSION (HCC): ICD-10-CM

## 2019-02-12 NOTE — TELEPHONE ENCOUNTER
Pt requesting refill of venlafaxine, no protocol , unable to approve:     Last Time Medication was Filled:  11/19/18 #90    Last Office Visit with PCP: 12/18/18    Future Appointments   Date Time Provider Dyan Gutiérrez   2/14/2019  4:45 PM Kendall Velásquez

## 2019-02-13 RX ORDER — VENLAFAXINE HYDROCHLORIDE 150 MG/1
150 CAPSULE, EXTENDED RELEASE ORAL
Qty: 90 CAPSULE | Refills: 3 | Status: SHIPPED | OUTPATIENT
Start: 2019-02-13 | End: 2019-10-15

## 2019-02-14 ENCOUNTER — OFFICE VISIT (OUTPATIENT)
Dept: RHEUMATOLOGY | Facility: CLINIC | Age: 54
End: 2019-02-14
Payer: COMMERCIAL

## 2019-02-14 VITALS
SYSTOLIC BLOOD PRESSURE: 138 MMHG | WEIGHT: 224 LBS | RESPIRATION RATE: 20 BRPM | TEMPERATURE: 99 F | DIASTOLIC BLOOD PRESSURE: 88 MMHG | HEIGHT: 66.5 IN | HEART RATE: 90 BPM | BODY MASS INDEX: 35.57 KG/M2

## 2019-02-14 DIAGNOSIS — M79.10 MYALGIA: ICD-10-CM

## 2019-02-14 DIAGNOSIS — L40.50 PSORIATIC ARTHRITIS (HCC): Primary | ICD-10-CM

## 2019-02-14 DIAGNOSIS — Z79.899 HIGH RISK MEDICATION USE: ICD-10-CM

## 2019-02-14 DIAGNOSIS — M79.671 BILATERAL FOOT PAIN: ICD-10-CM

## 2019-02-14 DIAGNOSIS — M25.50 ARTHRALGIA, UNSPECIFIED JOINT: ICD-10-CM

## 2019-02-14 DIAGNOSIS — M79.672 BILATERAL FOOT PAIN: ICD-10-CM

## 2019-02-14 DIAGNOSIS — R93.89 ABNORMAL CHEST CT: ICD-10-CM

## 2019-02-14 DIAGNOSIS — Z13.820 SCREENING FOR OSTEOPOROSIS: ICD-10-CM

## 2019-02-14 PROCEDURE — 99205 OFFICE O/P NEW HI 60 MIN: CPT | Performed by: INTERNAL MEDICINE

## 2019-02-14 RX ORDER — LEFLUNOMIDE 20 MG/1
1 TABLET ORAL DAILY
COMMUNITY
Start: 2019-02-08 | End: 2019-06-10

## 2019-02-14 NOTE — PROGRESS NOTES
?  RHEUMATOLOGY NEW PATIENT   Date of visit: 2/14/2019  ? Patient presents with:  Establish Care: New patient with psoriatic arthritis, previouslt treated by [facility] Rheumatology, currently on Orencia injectable weekly and leflunomide.  Tried and failed Jonathan Car further questions at this time. ?  Plan:  Diagnoses and all orders for this visit:    Psoriatic arthritis ([facility] [address]  -     XR DEXA BONE DENSITOMETRY (CPT=77080); Future  -     RHEUMATOID ARTHRITIS FACTOR; Future  -     CYCLIC CITRULLINATE PEP.  IGG; Future weekly) she felt like she had worsened depression which was not herself. She did retry at 2-3 different occasions.  She switched rheumatologists due to insurance and tried Enbrel for over 5 years, that seemed to have worked, but then she seemed to have buil of minimal elevations of alkaline phosphatase. Admits to some difficulty swallowing and sensation of food getting stuck even with water. Was worked up years ago with a swallow study, however was asymptomatic at that time.    Does admit to reflux for which Surgical History:  Past Surgical History:   Procedure Laterality Date   • ADENOIDECTOMY  1968   • ARTHROSCOPY OF JOINT UNLISTED  1/1/2006    Knee Arthroscopy   • ORAL SURGERY PROCEDURE  1/1/1989    Tooth Extraction, \" wisdom teeth x 4\"   • OTHER SURGICAL Inject 125 mg into the skin once a week.  Disp: 12 Syringe Rfl: 3   BACLOFEN 20 MG Oral Tab TAKE 1 TABLET BY MOUTH EVERY EVENING Disp: 90 tablet Rfl: 1   LEVOTHYROXINE SODIUM 112 MCG Oral Tab TAKE 1 TABLET BY MOUTH EVERY DAY BEFORE BREAKFAST Disp: 90 tablet Positive for depression. The patient is nervous/anxious and has insomnia. PHYSICAL EXAM   Today's Vitals:  Temperature Blood Pressure Heart Rate Resp Rate SpO2   Temp: 98.5 °F (36.9 °C) BP: 138/88 Pulse: 90 Resp: 20     ?   Current Weight Height BMI BS alert and oriented to person, place, and time. No cranial nerve deficit. Skin: Skin is warm and dry. She is not diaphoretic. Small scattered dry/scaly plaques over legs, abdomen and back. Diffuse nail thickening of toes.   Fingernails grossly normal. ABDOMEN:  Limited images of the upper abdomen are unremarkable. Accessory splenule noted. BONES:  Degenerative spurring is seen within the thoracic spine. OTHER:  Negative.       =====  CONCLUSION:    1.   Ground-glass perihilar interstitial infiltrates contralateral foot. No additional regions of synovial thickening, synovitis or hyperemia identified.     =====  CONCLUSION:    1. Synovial thickening and hyperemia along the dorsum of the talonavicular joint consistent with synovitis.  This is similar contr 10/22/2018    BUN 16 10/22/2018    BUNCREA 21.6 (H) 10/22/2018    CREATSERUM 0.74 10/22/2018    ANIONGAP 6 10/22/2018    GFR 83 09/22/2017    GFRNAA 92 10/22/2018    GFRAA 106 10/22/2018    CA 7.5 (L) 10/22/2018    OSMOCALC 285 10/22/2018    ALKPHO 139 (H)

## 2019-02-26 ENCOUNTER — APPOINTMENT (OUTPATIENT)
Dept: HEMATOLOGY/ONCOLOGY | Age: 54
End: 2019-02-26
Attending: FAMILY MEDICINE
Payer: COMMERCIAL

## 2019-02-27 ENCOUNTER — NURSE ONLY (OUTPATIENT)
Dept: HEMATOLOGY/ONCOLOGY | Age: 54
End: 2019-02-27
Attending: FAMILY MEDICINE
Payer: COMMERCIAL

## 2019-02-27 DIAGNOSIS — Z79.899 HIGH RISK MEDICATION USE: ICD-10-CM

## 2019-02-27 DIAGNOSIS — E83.51 HYPOCALCEMIA: ICD-10-CM

## 2019-02-27 DIAGNOSIS — R93.89 ABNORMAL CHEST CT: ICD-10-CM

## 2019-02-27 DIAGNOSIS — M25.50 ARTHRALGIA, UNSPECIFIED JOINT: ICD-10-CM

## 2019-02-27 DIAGNOSIS — L40.50 PSORIATIC ARTHRITIS (HCC): ICD-10-CM

## 2019-02-27 DIAGNOSIS — M79.10 MYALGIA: ICD-10-CM

## 2019-02-27 DIAGNOSIS — E89.0 POSTOPERATIVE HYPOTHYROIDISM: ICD-10-CM

## 2019-02-27 LAB
ALBUMIN SERPL-MCNC: 3.7 G/DL (ref 3.4–5)
ALBUMIN/GLOB SERPL: 1 {RATIO} (ref 1–2)
ALP LIVER SERPL-CCNC: 123 U/L (ref 41–108)
ALT SERPL-CCNC: 22 U/L (ref 13–56)
ANION GAP SERPL CALC-SCNC: 6 MMOL/L (ref 0–18)
AST SERPL-CCNC: 20 U/L (ref 15–37)
BASOPHILS # BLD AUTO: 0.09 X10(3) UL (ref 0–0.2)
BASOPHILS NFR BLD AUTO: 1.7 %
BILIRUB SERPL-MCNC: 0.4 MG/DL (ref 0.1–2)
BUN BLD-MCNC: 11 MG/DL (ref 7–18)
BUN/CREAT SERPL: 14.7 (ref 10–20)
CALCIUM BLD-MCNC: 8.6 MG/DL (ref 8.5–10.1)
CHLORIDE SERPL-SCNC: 102 MMOL/L (ref 98–107)
CK SERPL-CCNC: 84 U/L (ref 26–192)
CO2 SERPL-SCNC: 29 MMOL/L (ref 21–32)
CREAT BLD-MCNC: 0.75 MG/DL (ref 0.55–1.02)
CRP SERPL-MCNC: <0.29 MG/DL (ref ?–0.3)
DEPRECATED RDW RBC AUTO: 52.3 FL (ref 35.1–46.3)
EOSINOPHIL # BLD AUTO: 0.12 X10(3) UL (ref 0–0.7)
EOSINOPHIL NFR BLD AUTO: 2.3 %
ERYTHROCYTE [DISTWIDTH] IN BLOOD BY AUTOMATED COUNT: 16.9 % (ref 11–15)
GLOBULIN PLAS-MCNC: 3.6 G/DL (ref 2.8–4.4)
GLUCOSE BLD-MCNC: 93 MG/DL (ref 70–99)
HAV IGM SER QL: NONREACTIVE
HBV CORE IGM SER QL: NONREACTIVE
HBV SURFACE AG SERPL QL IA: NONREACTIVE
HCT VFR BLD AUTO: 40.6 % (ref 35–48)
HCV AB SERPL QL IA: NONREACTIVE
HGB BLD-MCNC: 12.5 G/DL (ref 12–16)
IMM GRANULOCYTES # BLD AUTO: 0.02 X10(3) UL (ref 0–1)
IMM GRANULOCYTES NFR BLD: 0.4 %
LYMPHOCYTES # BLD AUTO: 1.6 X10(3) UL (ref 1–4)
LYMPHOCYTES NFR BLD AUTO: 30.8 %
M PROTEIN MFR SERPL ELPH: 7.3 G/DL (ref 6.4–8.2)
MCH RBC QN AUTO: 26.4 PG (ref 26–34)
MCHC RBC AUTO-ENTMCNC: 30.8 G/DL (ref 31–37)
MCV RBC AUTO: 85.7 FL (ref 80–100)
MONOCYTES # BLD AUTO: 0.4 X10(3) UL (ref 0.1–1)
MONOCYTES NFR BLD AUTO: 7.7 %
NEUTROPHILS # BLD AUTO: 2.96 X10 (3) UL (ref 1.5–7.7)
NEUTROPHILS # BLD AUTO: 2.96 X10(3) UL (ref 1.5–7.7)
NEUTROPHILS NFR BLD AUTO: 57.1 %
OSMOLALITY SERPL CALC.SUM OF ELEC: 283 MOSM/KG (ref 275–295)
PLATELET # BLD AUTO: 224 10(3)UL (ref 150–450)
POTASSIUM SERPL-SCNC: 4.3 MMOL/L (ref 3.5–5.1)
RBC # BLD AUTO: 4.74 X10(6)UL (ref 3.8–5.3)
RHEUMATOID FACT SERPL-ACNC: <10 IU/ML (ref ?–15)
SED RATE-ML: 18 MM/HR (ref 0–25)
SODIUM SERPL-SCNC: 137 MMOL/L (ref 136–145)
TSI SER-ACNC: 0.86 MIU/ML (ref 0.36–3.74)
WBC # BLD AUTO: 5.2 X10(3) UL (ref 4–11)

## 2019-02-27 PROCEDURE — 84443 ASSAY THYROID STIM HORMONE: CPT

## 2019-02-27 PROCEDURE — 85025 COMPLETE CBC W/AUTO DIFF WBC: CPT

## 2019-02-27 PROCEDURE — 85652 RBC SED RATE AUTOMATED: CPT

## 2019-02-27 PROCEDURE — 80053 COMPREHEN METABOLIC PANEL: CPT

## 2019-02-27 PROCEDURE — 86431 RHEUMATOID FACTOR QUANT: CPT

## 2019-02-27 PROCEDURE — 82330 ASSAY OF CALCIUM: CPT

## 2019-02-27 PROCEDURE — 86140 C-REACTIVE PROTEIN: CPT

## 2019-02-27 PROCEDURE — 86200 CCP ANTIBODY: CPT

## 2019-02-27 PROCEDURE — 80074 ACUTE HEPATITIS PANEL: CPT

## 2019-02-27 PROCEDURE — 82550 ASSAY OF CK (CPK): CPT

## 2019-02-28 LAB
CALCIUM IONIZED PH 7.4: 1.27 MMOL/L
CALCIUM IONIZED, SERUM: 1.26 MMOL/L

## 2019-03-01 ENCOUNTER — TELEPHONE (OUTPATIENT)
Dept: FAMILY MEDICINE CLINIC | Facility: CLINIC | Age: 54
End: 2019-03-01

## 2019-03-01 LAB — CYCLIC CITRULLINATED PEPTIDE: 3 UNITS

## 2019-03-01 NOTE — TELEPHONE ENCOUNTER
----- Message from Daniella Gay DO sent at 2/27/2019  5:14 PM CST -----  Results reviewed. Released to [address] Tests show no significant abnormalities.

## 2019-03-04 ENCOUNTER — PATIENT MESSAGE (OUTPATIENT)
Dept: RHEUMATOLOGY | Facility: CLINIC | Age: 54
End: 2019-03-04

## 2019-03-04 NOTE — TELEPHONE ENCOUNTER
From: Mallorie Traylor  To: Mandi Naranjo DO  Sent: 3/4/2019 10:49 AM CST  Subject: Prescription Question    Good morning Dr Karen Lawton    I really appreciated meeting and talking with you mid-February. I think I would like to try a new medication.  I am due for

## 2019-03-05 ENCOUNTER — HOSPITAL ENCOUNTER (OUTPATIENT)
Dept: CT IMAGING | Age: 54
Discharge: HOME | End: 2019-03-05
Attending: INTERNAL MEDICINE
Payer: COMMERCIAL

## 2019-03-05 DIAGNOSIS — R91.8 PULMONARY INFILTRATES: ICD-10-CM

## 2019-03-05 DIAGNOSIS — R06.00 DYSPNEA: ICD-10-CM

## 2019-03-05 PROCEDURE — 71250 CT THORAX DX C-: CPT | Performed by: INTERNAL MEDICINE

## 2019-03-20 DIAGNOSIS — F41.9 ANXIETY: ICD-10-CM

## 2019-03-21 RX ORDER — ALPRAZOLAM 0.25 MG/1
0.25 TABLET ORAL 2 TIMES DAILY PRN
Qty: 30 TABLET | Refills: 0 | OUTPATIENT
Start: 2019-03-21 | End: 2019-07-25

## 2019-03-21 NOTE — TELEPHONE ENCOUNTER
Pt requesting refill of alprazolam, no protocol , unable to approve:     Last Time Medication was Filled:  10/8/18 #30    Last Office Visit with PCP: 12/18/18    Future Appointments   Date Time Provider Dyan Gutiérrez   5/16/2019  4:30 PM Kaylynn Herman,

## 2019-03-25 DIAGNOSIS — J81.1 CHRONIC PULMONARY EDEMA: ICD-10-CM

## 2019-03-25 RX ORDER — FUROSEMIDE 20 MG/1
20 TABLET ORAL 2 TIMES DAILY
Qty: 180 TABLET | Refills: 0 | Status: SHIPPED | OUTPATIENT
Start: 2019-03-25 | End: 2019-06-16

## 2019-03-25 NOTE — TELEPHONE ENCOUNTER
Pt requesting refill of furosemide, passed protocol , refill approved, sent to pharmacy:     Last Time Medication was Filled:  12/28/18 #180    Last Office Visit with PCP: 12/18/18    Future Appointments   Date Time Provider Dyan Gutiérrez   5/16/2019

## 2019-03-28 DIAGNOSIS — F41.9 ANXIETY: ICD-10-CM

## 2019-03-28 RX ORDER — ALPRAZOLAM 0.25 MG/1
TABLET ORAL
Qty: 30 TABLET | Refills: 0 | OUTPATIENT
Start: 2019-03-28

## 2019-03-28 NOTE — TELEPHONE ENCOUNTER
Spoke to pharmacist, states they never received phone in  for alprazolam from 3/21/19.  Provided verbal phone order from rx refill from Dr. Deni Ribera 3/21/19, states they will fill medication and call pharmacy

## 2019-04-11 DIAGNOSIS — G47.00 INSOMNIA, UNSPECIFIED TYPE: ICD-10-CM

## 2019-04-11 RX ORDER — TRAZODONE HYDROCHLORIDE 50 MG/1
TABLET ORAL
Qty: 90 TABLET | Refills: 3 | Status: SHIPPED | OUTPATIENT
Start: 2019-04-11 | End: 2019-10-15

## 2019-04-11 NOTE — TELEPHONE ENCOUNTER
Pt requesting refill of Trazadone, No protocol.     Last Time Medication was Filled:  7/20/2018 #90 with 1 refill    Last Office Visit with PCP: 12/18/2018    Future Appointments   Date Time Provider Dyan Gutiérrez   5/16/2019  4:30 PM Caitlin Green,  [facility] [address]

## 2019-04-13 ENCOUNTER — PATIENT MESSAGE (OUTPATIENT)
Dept: RHEUMATOLOGY | Facility: CLINIC | Age: 54
End: 2019-04-13

## 2019-04-13 DIAGNOSIS — Z79.899 HIGH RISK MEDICATION USE: ICD-10-CM

## 2019-04-13 DIAGNOSIS — L40.50 PSORIATIC ARTHRITIS (HCC): Primary | ICD-10-CM

## 2019-04-15 NOTE — TELEPHONE ENCOUNTER
From: Karie Lucero  To: Hector Herron DO  Sent: 4/13/2019 10:04 AM CDT  Subject: Prescription Question    Good Morning Doctor Kim:  I thought a bit about my PsA and treatment for it. My fingernails are starting to get noticeably worse.  I've already b

## 2019-04-16 NOTE — TELEPHONE ENCOUNTER
Rx's not protocol. Last ov 12/18 for px, pt due to return in one month if needed, otherwise 6 mos    Return in about 1 month (around 1/18/2019) for discuss labs, if needed- otherwise 6 mos. Please approve if appropriate.  Thanks.

## 2019-04-17 RX ORDER — VENLAFAXINE HYDROCHLORIDE 75 MG/1
CAPSULE, EXTENDED RELEASE ORAL
Qty: 90 CAPSULE | Refills: 1 | Status: SHIPPED | OUTPATIENT
Start: 2019-04-17 | End: 2019-10-12

## 2019-04-17 RX ORDER — BUPROPION HYDROCHLORIDE 300 MG/1
TABLET ORAL
Qty: 90 TABLET | Refills: 1 | Status: SHIPPED | OUTPATIENT
Start: 2019-04-17 | End: 2019-10-12

## 2019-04-18 DIAGNOSIS — M54.50 BILATERAL LOW BACK PAIN WITHOUT SCIATICA, UNSPECIFIED CHRONICITY: ICD-10-CM

## 2019-04-18 RX ORDER — BACLOFEN 20 MG/1
TABLET ORAL
Qty: 90 TABLET | Refills: 1 | Status: SHIPPED | OUTPATIENT
Start: 2019-04-18 | End: 2019-10-11

## 2019-04-18 NOTE — TELEPHONE ENCOUNTER
Pt requesting refill of Baclofen 20 mg, protocol failed, unable to approve:     Last Time Medication was Filled:  10/26/18 #90 with 1 refill    Last Office Visit with PCP: 12/18/2018      Future Appointments   Date Time Provider Dyan Gutiérrez   5/16/20

## 2019-04-26 ENCOUNTER — TELEPHONE (OUTPATIENT)
Dept: RHEUMATOLOGY | Facility: CLINIC | Age: 54
End: 2019-04-26

## 2019-04-29 ENCOUNTER — TELEPHONE (OUTPATIENT)
Dept: RHEUMATOLOGY | Facility: CLINIC | Age: 54
End: 2019-04-29

## 2019-04-30 DIAGNOSIS — R60.0 BILATERAL LEG EDEMA: ICD-10-CM

## 2019-04-30 NOTE — TELEPHONE ENCOUNTER
Pt requesting refill of Klor-Con 10, no protocol, unable to approve:     Last Time Medication was Filled:  12/18/2018    Last Office Visit with PCP: 12/18/2018      Patient due for OV in June 2019

## 2019-05-01 ENCOUNTER — TELEPHONE (OUTPATIENT)
Dept: RHEUMATOLOGY | Facility: CLINIC | Age: 54
End: 2019-05-01

## 2019-05-01 RX ORDER — POTASSIUM CHLORIDE 750 MG/1
TABLET, FILM COATED, EXTENDED RELEASE ORAL
Qty: 60 TABLET | Refills: 11 | Status: SHIPPED | OUTPATIENT
Start: 2019-05-01 | End: 2019-07-08 | Stop reason: DRUGHIGH

## 2019-05-01 NOTE — TELEPHONE ENCOUNTER
PA for Christiano Aviles denied. The preferred products for the patient's health plan are Cosentyx, Enbrel, Humira, and Otezla for psoriatic arthritis. She needs to try and fail 3 of these before she would qualify for Talz.  Patient has tried and failed Humira,Enbrel, Re

## 2019-05-07 NOTE — TELEPHONE ENCOUNTER
Pt returning [employer] phone call.  Pt states 'ok to leave a detailed message on her cell phone if unable to get into contact with her on work phone.' mp      Pt work [phone] (8 am- 4:30 pm)  Pt cell  [phone]

## 2019-05-16 ENCOUNTER — NURSE ONLY (OUTPATIENT)
Dept: RHEUMATOLOGY | Facility: CLINIC | Age: 54
End: 2019-05-16
Payer: COMMERCIAL

## 2019-05-16 DIAGNOSIS — L40.50 PSORIATIC ARTHRITIS (HCC): Primary | ICD-10-CM

## 2019-05-30 DIAGNOSIS — E89.0 POSTOPERATIVE HYPOTHYROIDISM: ICD-10-CM

## 2019-05-30 RX ORDER — LEVOTHYROXINE SODIUM 112 UG/1
TABLET ORAL
Qty: 90 TABLET | Refills: 0 | Status: SHIPPED | OUTPATIENT
Start: 2019-05-30 | End: 2019-08-29

## 2019-06-04 ENCOUNTER — TELEPHONE (OUTPATIENT)
Dept: RHEUMATOLOGY | Facility: CLINIC | Age: 54
End: 2019-06-04

## 2019-06-16 DIAGNOSIS — J81.1 CHRONIC PULMONARY EDEMA: ICD-10-CM

## 2019-06-17 RX ORDER — FUROSEMIDE 20 MG/1
TABLET ORAL
Qty: 180 TABLET | Refills: 0 | Status: SHIPPED | OUTPATIENT
Start: 2019-06-17 | End: 2019-09-19

## 2019-06-17 NOTE — TELEPHONE ENCOUNTER
Pt requesting refill of Furosamide 20 mg, NO protocol unable to approve:     Last Time Medication was Filled:  3/25/2019 #180 with 0 refills     Last Office Visit with PCP: 12/18/2018    No future appointments.

## 2019-07-03 RX ORDER — LIDOCAINE HYDROCHLORIDE 20 MG/ML
5 SOLUTION OROPHARYNGEAL
Qty: 100 ML | Refills: 0 | Status: SHIPPED | OUTPATIENT
Start: 2019-07-03 | End: 2020-03-10 | Stop reason: ALTCHOICE

## 2019-07-08 PROBLEM — Z85.850 HISTORY OF THYROID CANCER: Status: RESOLVED | Noted: 2017-10-16 | Resolved: 2019-07-08

## 2019-07-08 NOTE — PATIENT INSTRUCTIONS
As of October 6th 2014, the Drug Enforcement Agency [facility]) is reclassifying all hydrocodone combination medications from Schedule III to Schedule II. This includes medications such as Norco, Vicodin, Lortab, Zohydro, and Vicoprofen.      What this means for

## 2019-07-08 NOTE — PROGRESS NOTES
CHIEF COMPLAINT: Patient presents with:  Medication Follow-Up: Bupropion and Venalfaxine     HPI:     Tequila Monroe is a 53year old female presents for medication monitoring. Taking bupropion and venlafaxine.   Denies depressed mood or anhedonia or hopele CANCER   • Other (Other) Father         PSORIATIC ARTHRITIS   • Other (Other) Mother         DEPRESSION AND ANXIETY   • Cancer Maternal Grandmother         COLON CANCER   • Other (Other) Maternal Grandmother         DEPRESSION   • Heart Disorder Paternal G DAY Disp: 90 tablet Rfl: 0   Potassium Chloride ER 20 MEQ Oral Tab CR Take 1 tablet (20 mEq total) by mouth daily.  Disp: 30 tablet Rfl: 0   Pantoprazole Sodium 40 MG Oral Tab EC TAKE 1 TABLET BY MOUTH EVERY MORNING BEFORE BREAKFAST Disp: 90 tablet Rfl: 1 GI: good BS's,no masses,No HSM or tenderness. EXTREMITIES: no cyanosis, clubbing or edema, bilateral. No calf tenderness. Negative Tinel failing. Grasp strength is post to bilateral.  No muscular atrophy of hands, wrists or fingers and nontender.   Ne HGB 02/27/2019 12.5    • HCT 02/27/2019 40.6    • PLT 02/27/2019 224.0    • MCV 02/27/2019 85.7    • MCH 02/27/2019 26.4    • MCHC 02/27/2019 30.8*   • RDW 02/27/2019 16.9*   • RDW-SD 02/27/2019 52.3*   • Neutrophil Absolute Prel* 02/27/2019 2.96    • Neut 11/30/2019  Annual Depression Screen due on 12/18/2019  Annual Physical due on 12/18/2019  Mammogram due on 01/30/2020  Pap Smear,3 Years due on 12/18/2021  Colonoscopy due on 06/02/2026      Patient/Caregiver Education: Patient/Caregiver Education: There

## 2019-07-09 ENCOUNTER — TELEPHONE (OUTPATIENT)
Dept: FAMILY MEDICINE CLINIC | Facility: CLINIC | Age: 54
End: 2019-07-09

## 2019-07-09 PROBLEM — R20.2 PARESTHESIA OF BOTH HANDS: Status: ACTIVE | Noted: 2019-07-09

## 2019-07-09 NOTE — TELEPHONE ENCOUNTER
Left detailed message for patient with recommendations from Dr. Crow Yanez. Patient instructed to call office back with any further questions or concerns.

## 2019-07-17 RX ORDER — PANTOPRAZOLE SODIUM 40 MG/1
TABLET, DELAYED RELEASE ORAL
Qty: 30 TABLET | Refills: 0 | Status: SHIPPED | OUTPATIENT
Start: 2019-07-17 | End: 2019-07-25

## 2019-07-17 RX ORDER — PANTOPRAZOLE SODIUM 40 MG/1
TABLET, DELAYED RELEASE ORAL
Qty: 90 TABLET | Refills: 1 | OUTPATIENT
Start: 2019-07-17

## 2019-07-17 NOTE — TELEPHONE ENCOUNTER
Pt is due for GERD f-u, has not been documented within the last 12 months. Rx approved only for 30 days, pls schedule f-u with Dr. Robert Alcantar. Thanks.

## 2019-07-17 NOTE — TELEPHONE ENCOUNTER
Left detailed message informing patient rx filled for 30 days. Informed patient to schedule appt with Dr. Funmi Pritchard to f/u on GERD to prevent any delay in future refills since it has been more than a year since Dr. Funmi Pritchard has addressed any symptoms.     LM

## 2019-07-17 NOTE — TELEPHONE ENCOUNTER
Pt requesting refill of pantorazole, no protocol, unable to approve:     Last Time Medication was Filled: 10/09/18    Last Office Visit with PCP: 7/8/2019      Future Appointments   Date Time Provider Dyan Gutiérrez   8/19/2019  4:00 PM VARSHA Kaur

## 2019-07-25 ENCOUNTER — PATIENT MESSAGE (OUTPATIENT)
Dept: FAMILY MEDICINE CLINIC | Facility: CLINIC | Age: 54
End: 2019-07-25

## 2019-07-25 DIAGNOSIS — F41.9 ANXIETY: ICD-10-CM

## 2019-07-25 RX ORDER — PANTOPRAZOLE SODIUM 40 MG/1
TABLET, DELAYED RELEASE ORAL
Qty: 30 TABLET | Refills: 0 | Status: CANCELLED | OUTPATIENT
Start: 2019-07-25

## 2019-07-25 RX ORDER — PANTOPRAZOLE SODIUM 40 MG/1
TABLET, DELAYED RELEASE ORAL
Qty: 90 TABLET | Refills: 1 | Status: SHIPPED | OUTPATIENT
Start: 2019-07-25 | End: 2019-09-16

## 2019-07-25 RX ORDER — ALPRAZOLAM 0.25 MG/1
0.25 TABLET ORAL 2 TIMES DAILY PRN
Qty: 30 TABLET | Refills: 0 | OUTPATIENT
Start: 2019-07-25 | End: 2020-03-10

## 2019-07-25 NOTE — TELEPHONE ENCOUNTER
From: Arthur Marroquin  To: Lola Tanner DO  Sent: 7/25/2019 2:50 PM CDT  Subject: Non-Urgent Medical Question    Tariq Salguero if someone would be able to call in an Rx for Xanax 0.25mg for me.  I lost my job last week and the anxiety/panic creeps up

## 2019-07-26 NOTE — PROGRESS NOTES
Spoke with Erik-pharmacist at [facility] called in Alprazolam 0.25mg prescription.

## 2019-07-31 ENCOUNTER — PATIENT MESSAGE (OUTPATIENT)
Dept: FAMILY MEDICINE CLINIC | Facility: CLINIC | Age: 54
End: 2019-07-31

## 2019-07-31 DIAGNOSIS — Z01.84 IMMUNITY STATUS TESTING: ICD-10-CM

## 2019-07-31 DIAGNOSIS — Z11.59 NEED FOR HEPATITIS B SCREENING TEST: Primary | ICD-10-CM

## 2019-07-31 NOTE — TELEPHONE ENCOUNTER
From: Radha Robles  To: Karen Fagan DO  Sent: 7/31/2019 1:41 PM CDT  Subject: Visit Follow-up Question    It has been MANY years (30+) since I have had my HepB vaccinations and my potential employer is needing a HepB titer checked.  Would Dr Jj giordano

## 2019-07-31 NOTE — PROGRESS NOTES
Please see [employer] message, labs pended.      LOV: 7/8/2019  NOV:   Future Appointments   Date Time Provider Dyan Gutiérrez   8/19/2019  4:00 PM Gabby Ortiz DO [id number] [address]   1/6/2020  5:40 PM Quinn Ortega DO [facility] [place]     Last p

## 2019-08-29 DIAGNOSIS — E89.0 POSTOPERATIVE HYPOTHYROIDISM: ICD-10-CM

## 2019-08-29 RX ORDER — LEVOTHYROXINE SODIUM 112 UG/1
TABLET ORAL
Qty: 90 TABLET | Refills: 0 | Status: SHIPPED | OUTPATIENT
Start: 2019-08-29 | End: 2019-09-16

## 2019-08-29 NOTE — TELEPHONE ENCOUNTER
Pt requesting refill of LEVOTHYROXINE SODIUM 112 MCG Oral Tab , passed protocol , refill approved, sent to pharmacy.

## 2019-09-04 RX ORDER — LEFLUNOMIDE 20 MG/1
TABLET ORAL
Qty: 30 TABLET | Refills: 0 | Status: SHIPPED | OUTPATIENT
Start: 2019-09-04

## 2019-09-06 ENCOUNTER — PATIENT MESSAGE (OUTPATIENT)
Dept: FAMILY MEDICINE CLINIC | Facility: CLINIC | Age: 54
End: 2019-09-06

## 2019-09-16 DIAGNOSIS — E89.0 POSTOPERATIVE HYPOTHYROIDISM: ICD-10-CM

## 2019-09-16 DIAGNOSIS — J81.1 CHRONIC PULMONARY EDEMA: ICD-10-CM

## 2019-09-18 RX ORDER — LEVOTHYROXINE SODIUM 112 UG/1
112 TABLET ORAL
Qty: 90 TABLET | Refills: 0 | Status: SHIPPED | OUTPATIENT
Start: 2019-09-18 | End: 2019-12-18

## 2019-09-18 RX ORDER — PANTOPRAZOLE SODIUM 40 MG/1
TABLET, DELAYED RELEASE ORAL
Qty: 90 TABLET | Refills: 0 | Status: SHIPPED | OUTPATIENT
Start: 2019-09-18 | End: 2019-12-18

## 2019-09-18 RX ORDER — LISINOPRIL 20 MG/1
20 TABLET ORAL
Qty: 90 TABLET | Refills: 0 | Status: SHIPPED | OUTPATIENT
Start: 2019-09-18 | End: 2019-12-18

## 2019-09-18 NOTE — TELEPHONE ENCOUNTER
Pt requesting refill of levothyroxine, pantoprazole,lisinopril, passed protocol , refill approved, sent to pharmacy:     Last Time Medication was Filled:  8/29,7/25,12/18    Last Office Visit with PCP: 7/8/19    Physical scheduled on 1/6/2020    Katlin cortes

## 2019-09-19 DIAGNOSIS — J81.1 CHRONIC PULMONARY EDEMA: ICD-10-CM

## 2019-09-19 RX ORDER — FUROSEMIDE 20 MG/1
TABLET ORAL
Qty: 180 TABLET | Refills: 0 | Status: SHIPPED | OUTPATIENT
Start: 2019-09-19 | End: 2019-10-15

## 2019-09-19 NOTE — TELEPHONE ENCOUNTER
Pt requesting refill of furosemide, passed protocol , refill approved, sent to pharmacy:     Last Time Medication was Filled:  6/18/19    Last Office Visit with PCP: 7/8/19    Physical scheduled on 1/6/2020

## 2019-09-23 ENCOUNTER — PATIENT MESSAGE (OUTPATIENT)
Dept: FAMILY MEDICINE CLINIC | Facility: CLINIC | Age: 54
End: 2019-09-23

## 2019-09-23 DIAGNOSIS — Z13.0 SCREENING FOR ENDOCRINE, NUTRITIONAL, METABOLIC AND IMMUNITY DISORDER: ICD-10-CM

## 2019-09-23 DIAGNOSIS — Z11.59 NEED FOR HEPATITIS B SCREENING TEST: Primary | ICD-10-CM

## 2019-09-23 DIAGNOSIS — E78.00 PURE HYPERCHOLESTEROLEMIA: ICD-10-CM

## 2019-09-23 DIAGNOSIS — Z13.21 SCREENING FOR ENDOCRINE, NUTRITIONAL, METABOLIC AND IMMUNITY DISORDER: ICD-10-CM

## 2019-09-23 DIAGNOSIS — Z13.29 SCREENING FOR ENDOCRINE, NUTRITIONAL, METABOLIC AND IMMUNITY DISORDER: ICD-10-CM

## 2019-09-23 DIAGNOSIS — I10 ESSENTIAL HYPERTENSION: ICD-10-CM

## 2019-09-23 DIAGNOSIS — E28.2 POLYCYSTIC OVARIES: ICD-10-CM

## 2019-09-23 DIAGNOSIS — Z13.228 SCREENING FOR ENDOCRINE, NUTRITIONAL, METABOLIC AND IMMUNITY DISORDER: ICD-10-CM

## 2019-09-24 NOTE — TELEPHONE ENCOUNTER
From: Bill Garcia  To:  Josh Xiao DO  Sent: 9/23/2019 9:16 PM CDT  Subject: Non-Urgent Medical Question    I have [employer] insurance through [address] and at this point, it looks like I will need to have labs done through [address] if I have do

## 2019-09-30 ENCOUNTER — TELEPHONE (OUTPATIENT)
Dept: RHEUMATOLOGY | Facility: CLINIC | Age: 54
End: 2019-09-30

## 2019-10-09 DIAGNOSIS — E28.2 POLYCYSTIC OVARIES: ICD-10-CM

## 2019-10-09 NOTE — TELEPHONE ENCOUNTER
Requested Prescriptions     Pending Prescriptions Disp Refills   • METFORMIN  MG Oral Tab [Pharmacy Med Name: METFORMIN  MG TABLET] 180 tablet 2     Sig: TAKE 1 TABLET BY MOUTH TWICE A DAY WITH MEALS         Pt requesting refill of metformin,

## 2019-10-11 DIAGNOSIS — M54.50 BILATERAL LOW BACK PAIN WITHOUT SCIATICA, UNSPECIFIED CHRONICITY: ICD-10-CM

## 2019-10-11 RX ORDER — BACLOFEN 20 MG/1
TABLET ORAL
Qty: 90 TABLET | Refills: 1 | Status: SHIPPED | OUTPATIENT
Start: 2019-10-11 | End: 2019-10-15

## 2019-10-11 NOTE — TELEPHONE ENCOUNTER
Pt requesting refill of baclofen, protocol failed, unable to approve:     Last Time Medication was Filled:  7/8/19    Last Office Visit with PCP: 7/8/19    Physical scheduled on 1/6/2020

## 2019-10-14 RX ORDER — BUPROPION HYDROCHLORIDE 300 MG/1
TABLET ORAL
Qty: 90 TABLET | Refills: 1 | Status: SHIPPED | OUTPATIENT
Start: 2019-10-14 | End: 2019-10-15

## 2019-10-14 RX ORDER — VENLAFAXINE HYDROCHLORIDE 75 MG/1
CAPSULE, EXTENDED RELEASE ORAL
Qty: 90 CAPSULE | Refills: 1 | Status: SHIPPED | OUTPATIENT
Start: 2019-10-14 | End: 2019-10-15

## 2019-10-14 NOTE — TELEPHONE ENCOUNTER
Requested Prescriptions     Pending Prescriptions Disp Refills   • BUPROPION HCL ER, XL, 300 MG Oral Tablet 24 Hr [Pharmacy Med Name: BUPROPION HCL  MG TABLET] 90 tablet 1     Sig: TAKE 1 TABLET BY MOUTH EVERY DAY   • VENLAFAXINE HCL ER 75 MG Oral Ca

## 2019-10-15 RX ORDER — POTASSIUM CHLORIDE 20 MEQ/1
20 TABLET, EXTENDED RELEASE ORAL DAILY
Qty: 90 TABLET | Refills: 0 | Status: SHIPPED | OUTPATIENT
Start: 2019-10-15 | End: 2020-01-06

## 2019-10-15 NOTE — TELEPHONE ENCOUNTER
Requested Prescriptions     Pending Prescriptions Disp Refills   • Potassium Chloride ER 20 MEQ Oral Tab CR 90 tablet 0     Sig: Take 1 tablet (20 mEq total) by mouth daily.      Pt requesting refill of Potassium chloride, protocol failed, unable to approve

## 2019-12-18 ENCOUNTER — TELEPHONE (OUTPATIENT)
Dept: FAMILY MEDICINE CLINIC | Facility: CLINIC | Age: 54
End: 2019-12-18

## 2019-12-18 DIAGNOSIS — J81.1 CHRONIC PULMONARY EDEMA: ICD-10-CM

## 2019-12-18 DIAGNOSIS — E89.0 POSTOPERATIVE HYPOTHYROIDISM: ICD-10-CM

## 2019-12-18 RX ORDER — LEVOTHYROXINE SODIUM 112 UG/1
112 TABLET ORAL
Qty: 90 TABLET | Refills: 0 | Status: SHIPPED | OUTPATIENT
Start: 2019-12-18 | End: 2019-12-27

## 2019-12-18 RX ORDER — PANTOPRAZOLE SODIUM 40 MG/1
TABLET, DELAYED RELEASE ORAL
Qty: 90 TABLET | Refills: 0 | Status: SHIPPED | OUTPATIENT
Start: 2019-12-18 | End: 2020-01-06

## 2019-12-18 RX ORDER — LISINOPRIL 20 MG/1
20 TABLET ORAL
Qty: 90 TABLET | Refills: 0 | Status: SHIPPED | OUTPATIENT
Start: 2019-12-18 | End: 2020-03-24

## 2019-12-18 NOTE — TELEPHONE ENCOUNTER
Pt requesting refill of   Requested Prescriptions     Signed Prescriptions Disp Refills   • lisinopril 20 MG Oral Tab 90 tablet 0     Sig: Take 1 tablet (20 mg total) by mouth once daily.      Authorizing Provider: Ellen Vizcarra     Ordering User: Jose Hammond

## 2019-12-18 NOTE — TELEPHONE ENCOUNTER
Pt requesting refill of   Requested Prescriptions     Signed Prescriptions Disp Refills   • Levothyroxine Sodium 112 MCG Oral Tab 90 tablet 0     Sig: Take 1 tablet (112 mcg total) by mouth before breakfast.     Authorizing Provider: Syed Owens

## 2019-12-26 ENCOUNTER — PATIENT MESSAGE (OUTPATIENT)
Dept: FAMILY MEDICINE CLINIC | Facility: CLINIC | Age: 54
End: 2019-12-26

## 2019-12-26 DIAGNOSIS — E89.0 POSTOPERATIVE HYPOTHYROIDISM: ICD-10-CM

## 2019-12-27 RX ORDER — LEVOTHYROXINE SODIUM 112 UG/1
112 TABLET ORAL
Qty: 90 TABLET | Refills: 0 | Status: SHIPPED | OUTPATIENT
Start: 2019-12-27 | End: 2020-03-24

## 2019-12-27 NOTE — TELEPHONE ENCOUNTER
From: Wanda Bravo  To: Ingrid Becker DO  Sent: 12/26/2019 1:02 PM CST  Subject: Prescription Question    Would you be able to re-send my levothyroxine Rx to [facility]? They all go there now.  Thanks!!

## 2020-01-06 ENCOUNTER — OFFICE VISIT (OUTPATIENT)
Dept: FAMILY MEDICINE CLINIC | Facility: CLINIC | Age: 55
End: 2020-01-06
Payer: COMMERCIAL

## 2020-01-06 VITALS
HEART RATE: 89 BPM | WEIGHT: 225 LBS | SYSTOLIC BLOOD PRESSURE: 124 MMHG | DIASTOLIC BLOOD PRESSURE: 82 MMHG | TEMPERATURE: 99 F | BODY MASS INDEX: 35.73 KG/M2 | OXYGEN SATURATION: 96 % | HEIGHT: 66.5 IN

## 2020-01-06 DIAGNOSIS — R60.0 BILATERAL LOWER EXTREMITY EDEMA: ICD-10-CM

## 2020-01-06 DIAGNOSIS — K21.9 GASTROESOPHAGEAL REFLUX DISEASE WITHOUT ESOPHAGITIS: ICD-10-CM

## 2020-01-06 DIAGNOSIS — E89.0 POSTOPERATIVE HYPOTHYROIDISM: ICD-10-CM

## 2020-01-06 DIAGNOSIS — F32.A ANXIETY AND DEPRESSION: ICD-10-CM

## 2020-01-06 DIAGNOSIS — F41.9 ANXIETY AND DEPRESSION: ICD-10-CM

## 2020-01-06 DIAGNOSIS — E78.00 PURE HYPERCHOLESTEROLEMIA: ICD-10-CM

## 2020-01-06 DIAGNOSIS — Z12.39 SCREENING FOR MALIGNANT NEOPLASM OF BREAST: ICD-10-CM

## 2020-01-06 DIAGNOSIS — I10 ESSENTIAL HYPERTENSION: Primary | ICD-10-CM

## 2020-01-06 DIAGNOSIS — E28.2 POLYCYSTIC OVARIES: ICD-10-CM

## 2020-01-06 PROCEDURE — 99214 OFFICE O/P EST MOD 30 MIN: CPT | Performed by: FAMILY MEDICINE

## 2020-01-06 RX ORDER — POTASSIUM CHLORIDE 20 MEQ/1
20 TABLET, EXTENDED RELEASE ORAL DAILY
Qty: 90 TABLET | Refills: 0 | Status: SHIPPED | OUTPATIENT
Start: 2020-01-06 | End: 2020-03-24

## 2020-01-06 RX ORDER — PANTOPRAZOLE SODIUM 40 MG/1
TABLET, DELAYED RELEASE ORAL
Qty: 90 TABLET | Refills: 3 | Status: SHIPPED | OUTPATIENT
Start: 2020-01-06 | End: 2020-12-08

## 2020-01-06 RX ORDER — IXEKIZUMAB 80 MG/ML
INJECTION, SOLUTION SUBCUTANEOUS
COMMUNITY
Start: 2019-12-04 | End: 2020-04-20

## 2020-01-07 NOTE — PROGRESS NOTES
CHIEF COMPLAINT: Patient presents with:  Medication Follow-Up: Venalafaxine, Alprazolam and Bupropion     HPI:     Saige Howell is a 53year old female presents for medication monitoring. Taking bupropion and venlafaxine.   Denies depressed mood or anhedo lb (107.5 kg)    HISTORY:  Past Medical History:   Diagnosis Date   • Acute bronchitis    • ANXIETY    • DEPRESSION    • Hx of pregnancy 1998, 2003    x2   • HYPERTENSION    • Lumbago 6/29/2012   • OSTEOARTHRITIS    • OTHER DISEASES     thyroid   • Psoriat Hr Take 1 capsule (75 mg total) by mouth once daily.  90 capsule 1   • baclofen 20 MG Oral Tab TAKE 1 TABLET BY MOUTH EVERY DAY IN THE EVENING 90 tablet 1   • metFORMIN HCl 850 MG Oral Tab TAKE 1 TABLET BY MOUTH TWICE A DAY WITH MEALS 180 tablet 2   • furos Review of Systems  Positive for stated complaint: No complaints. Denies depressed mood or anhedonia   Pertinent positives and negatives noted in the the HPI.     PHYSICAL EXAM:   /82 (BP Location: Right arm, Patient Position: Sitting, Cuff Size: NON-AFR.  AMERICAN 10/04/2019 93    • eGFR  10/04/2019 107    • BUN/CREATININE RATIO 61/65/6196 NOT APPLICABLE    • SODIUM 98/96/2802 138    • POTASSIUM 10/04/2019 4.6    • CHLORIDE 10/04/2019 101    • CARBON DIOXIDE 10/04/2019 30    • CALCI follicular adenoma followed for 10 years by Dr. Xiao Guerrero at [facility]  Due for TSH and free T4 in February-external order given  Continue levothyroxine    Meds This Visit:  Requested Prescriptions     Signed Prescriptions Disp Refills   • SCREENING BILAT (CPT=77067/85867)     7/8/2019  Iker Yanez, DO      Patient understands plan and follow-up.   Return in about 3 months (around 4/6/2020) for annual physical, fasting labs AM.

## 2020-01-07 NOTE — PATIENT INSTRUCTIONS
As of October 6th 2014, the Drug Enforcement Agency [facility]) is reclassifying all hydrocodone combination medications from Schedule III to Schedule II. This includes medications such as Norco, Vicodin, Lortab, Zohydro, and Vicoprofen.      What this means for the blood vessels. When you have high cholesterol, it can build up along the walls of the blood vessels. This makes the vessels narrower and decreases blood flow. You are then at greater risk of having a heart attack or a stroke.   Good and bad cholesterol at a level where you feel comfortable. Increase your time and pace a little each week. · Work up to 30 to 40 minutes of moderate to high intensity physical activity at least 3 to 4 days per week. · Remember, some activity is better than none.   · If you h eating. · Do not snack before going to bed. Raise your head  Raising your head and upper body by 4 to 6 inches helps limit reflux when you’re lying down. Put blocks under the head of your bed frame to raise it.   Other changes  · Lose weight, if you need others  Get help  Don't try to handle this alone. You can be the most help by getting the person to a trained professional. Suicidal thinking may be a sign of depression, a serious but treatable illness.   In an emergency—call 911  Don't leave the person al

## 2020-01-08 PROBLEM — R60.0 BILATERAL LOWER EXTREMITY EDEMA: Status: ACTIVE | Noted: 2020-01-08

## 2020-01-09 DIAGNOSIS — J81.1 CHRONIC PULMONARY EDEMA: ICD-10-CM

## 2020-01-10 RX ORDER — FUROSEMIDE 20 MG/1
TABLET ORAL
Qty: 180 TABLET | Refills: 0 | Status: SHIPPED | OUTPATIENT
Start: 2020-01-10 | End: 2020-03-24

## 2020-01-10 NOTE — TELEPHONE ENCOUNTER
Pt requesting refill of   Requested Prescriptions     Pending Prescriptions Disp Refills   • FUROSEMIDE 20 MG Oral Tab [Pharmacy Med Name: FUROSEMIDE TABS 20MG] 180 tablet 4     Sig: TAKE 1 TABLET TWICE A DAY     Passed protocol, refill approved, sent to

## 2020-03-02 ENCOUNTER — TELEPHONE (OUTPATIENT)
Dept: FAMILY MEDICINE CLINIC | Facility: CLINIC | Age: 55
End: 2020-03-02

## 2020-03-02 NOTE — TELEPHONE ENCOUNTER
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 States this is a chronic issue. She is going through menopause, so states she has had increase anxiety. States she does have alprazolam and will use prn.

## 2020-03-10 PROBLEM — R53.83 OTHER FATIGUE: Status: ACTIVE | Noted: 2020-03-10

## 2020-03-10 PROBLEM — R13.10 ODYNOPHAGIA: Status: ACTIVE | Noted: 2020-03-10

## 2020-03-10 PROBLEM — F33.1 MODERATE EPISODE OF RECURRENT MAJOR DEPRESSIVE DISORDER (HCC): Status: ACTIVE | Noted: 2020-03-10

## 2020-03-10 PROBLEM — F41.9 ANXIETY: Status: ACTIVE | Noted: 2020-03-10

## 2020-03-10 NOTE — PATIENT INSTRUCTIONS
· Taking antidepressants can put you at risk of increased suicidal thoughts or depression  · If use feel suicidal or homicidal, call 911 or go to the nearest ER or call our office.     Depression Affects Your Mind and Body  Everyone feels sad or “blue” from reserved. This information is not intended as a substitute for professional medical care. Always follow your healthcare professional's instructions.         Depression: Tips to Help Yourself    As your healthcare providers help treat your depression, you ca Don’t isolate yourself—you’ll only feel worse. Try to be with other people. And take part in fun activities when you can. Go to a movie, ballgame, Jainism service, or social event. Talk openly with people you can trust. And accept help when it’s offered. usual?  · Do I feel tired, weak, and low on energy? · Do I feel restless and unable to sit still? · Do I have trouble thinking or making choices? · Do I cry more than usual?  · Do I feel life isn't worth living?   Warning signs for suicide  · Thinking of

## 2020-03-10 NOTE — PROGRESS NOTES
[facility] COMPLAINT: Patient presents with:  Depression: lack of interest and fatigue     HPI:     Velia Sampson is a 53year old female presents for discuss depression and anxiety. States x 2-3mos feels depressed, sad, anhedonia, crying spells fatigued.  Ab Readings from Last 6 Encounters:  03/10/20 : 226 lb (102.5 kg)  01/06/20 : 225 lb (102.1 kg)  07/08/19 : 218 lb 3.2 oz (99 kg)  02/14/19 : 224 lb (101.6 kg)  12/18/18 : 230 lb (104.3 kg)  10/18/18 : 234 lb (106.1 kg)    HISTORY:  Past Medical History:   Martha Mirza total) by mouth daily.  90 tablet 0   • Pantoprazole Sodium 40 MG Oral Tab EC TAKE 1 TABLET BY MOUTH EVERY DAY BEFORE BREAKFAST 90 tablet 3   • Levothyroxine Sodium 112 MCG Oral Tab Take 1 tablet (112 mcg total) by mouth before breakfast. 90 tablet 0   • li HPI.    PHYSICAL EXAM:   /84 (BP Location: Left arm, Patient Position: Sitting, Cuff Size: adult)   Pulse 78   Temp 98 °F (36.7 °C) (Oral)   Resp 16   Ht 66.5\"   Wt 226 lb (102.5 kg)   LMP  (LMP Unknown)   SpO2 98%   BMI 35.93 kg/m²   Vital signs re PROTEIN, TOTAL 10/04/2019 6.4    • ALBUMIN 10/04/2019 4.1    • GLOBULIN 10/04/2019 2.3    • ALBUMIN/GLOBULIN RATIO 10/04/2019 1.8    • BILIRUBIN, TOTAL 10/04/2019 0.5    • ALKALINE PHOSPHATASE 10/04/2019 110    • AST 10/04/2019 18    • ALT 10/04/2019 13 related due to anxiety. Continue PPI  Start evaluation of thyroid for any apparent regrowth or malignancy and will consult ENT. May need GI evaluation    5. Odynophagia  - ENT - INTERNAL  - THYROID ANTITHYROGLOBULIN AB  - US THYROID (KMD=22813);  Future of native heart without angina pectoris     Atypical follicular adenoma     Paresthesia of both hands     Bilateral lower extremity edema      Imaging & Referrals:  None     3/10/2020  Patti Hernandez, DO      Patient understands plan and follow-up.   Return

## 2020-03-16 ENCOUNTER — PATIENT MESSAGE (OUTPATIENT)
Dept: FAMILY MEDICINE CLINIC | Facility: CLINIC | Age: 55
End: 2020-03-16

## 2020-03-16 DIAGNOSIS — F41.9 ANXIETY: ICD-10-CM

## 2020-03-16 RX ORDER — ALPRAZOLAM 0.25 MG/1
TABLET ORAL
Qty: 30 TABLET | Refills: 0 | Status: SHIPPED | OUTPATIENT
Start: 2020-03-16

## 2020-03-16 NOTE — TELEPHONE ENCOUNTER
Prescription clarification for alprazolam original prescription instructions take 1 tablet twice daily for sleep   Pended for may take 1-2 tablets daily

## 2020-03-17 NOTE — TELEPHONE ENCOUNTER
From: Tequila Monroe  To: Ulises Sosa DO  Sent: 3/16/2020 9:20 PM CDT  Subject: Prescription Question    So I still don't have the bupropion med from [facility] yet. I called today to find out why it is still in process.  Eventually she told me it wa

## 2020-03-18 RX ORDER — BUPROPION HYDROCHLORIDE 150 MG/1
150 TABLET ORAL DAILY
Qty: 30 TABLET | Refills: 0 | Status: SHIPPED | OUTPATIENT
Start: 2020-03-18 | End: 2020-07-16

## 2020-03-18 NOTE — TELEPHONE ENCOUNTER
Signed Rx bupropion  mg to take in addition to the 300mg for a total of 450. This will allow patient 30 days to see if has effect on her symptoms. Please inform. Rx sent to [facility] and hopefully I will see her at her upcoming appointment.

## 2020-03-18 NOTE — TELEPHONE ENCOUNTER
Patient notified  Patient verbalizes understanding. Future apt was 3/24/2020 since patient has not started new medication yet. appt moved to 4/7/2020.

## 2020-03-24 DIAGNOSIS — I10 ESSENTIAL HYPERTENSION: ICD-10-CM

## 2020-03-24 DIAGNOSIS — J81.1 CHRONIC PULMONARY EDEMA: ICD-10-CM

## 2020-03-24 DIAGNOSIS — M54.50 BILATERAL LOW BACK PAIN WITHOUT SCIATICA, UNSPECIFIED CHRONICITY: ICD-10-CM

## 2020-03-24 DIAGNOSIS — E89.0 POSTOPERATIVE HYPOTHYROIDISM: ICD-10-CM

## 2020-03-24 RX ORDER — FUROSEMIDE 20 MG/1
TABLET ORAL
Qty: 180 TABLET | Refills: 3 | Status: SHIPPED | OUTPATIENT
Start: 2020-03-24 | End: 2021-01-13

## 2020-03-24 RX ORDER — BACLOFEN 20 MG/1
TABLET ORAL
Qty: 90 TABLET | Refills: 3 | Status: SHIPPED | OUTPATIENT
Start: 2020-03-24 | End: 2020-11-09

## 2020-03-24 RX ORDER — LEVOTHYROXINE SODIUM 112 UG/1
TABLET ORAL
Qty: 90 TABLET | Refills: 3 | Status: SHIPPED | OUTPATIENT
Start: 2020-03-24 | End: 2020-12-08

## 2020-03-24 RX ORDER — POTASSIUM CHLORIDE 20 MEQ/1
TABLET, EXTENDED RELEASE ORAL
Qty: 90 TABLET | Refills: 3 | Status: SHIPPED | OUTPATIENT
Start: 2020-03-24 | End: 2021-01-13

## 2020-03-24 RX ORDER — LISINOPRIL 20 MG/1
TABLET ORAL
Qty: 90 TABLET | Refills: 3 | Status: SHIPPED | OUTPATIENT
Start: 2020-03-24 | End: 2020-12-08

## 2020-03-24 NOTE — TELEPHONE ENCOUNTER
Pt requesting refill of   Lisinopril 20 mg #90 with 0 refills     Passed protocol, refill approved, sent to pharmacy:     Last Time Medication was Filled:  12/18/2019     Last Office Visit with Provider: 03/10/2020    Your appointments     Date & Time Ap

## 2020-03-24 NOTE — TELEPHONE ENCOUNTER
Pt requesting refill of Potassium Chloride 20 MEQ oral Tab.      No protocol available, routed to provider to review medication request:     Last Time Medication was Filled:  01/06/2020     Last Office Visit with Provider: 03/10/2020                   Your

## 2020-03-24 NOTE — TELEPHONE ENCOUNTER
Pt requesting refill of Levothyroxine 112 MCG #90 with 0 refills     Passed protocol, refill approved, sent to pharmacy:      Last Time Medication was Filled:  12/18/2019      Last Office Visit with Provider: 03/10/2020             Your appointments

## 2020-04-07 NOTE — PROGRESS NOTES
Virtual/Telephone Check-In    Jose Rafael Barr verbally consents to a Virtual/Telephone Check-In service on 04/07/20.   Patient understands and accepts financial responsibility for any deductible, co-insurance and/or co-pays associated with this service      D antithyroglobulin antibodies   If ENT does not feel dysphasia related to thyroid disease then will refer to GI. Agrees to schedule ENT appointment      Patient understands plan and follow-up.   Recommend follow-up in office in 5 weeks for face-to-face vis

## 2020-04-14 ENCOUNTER — VIRTUAL PHONE E/M (OUTPATIENT)
Dept: FAMILY MEDICINE CLINIC | Facility: CLINIC | Age: 55
End: 2020-04-14
Payer: COMMERCIAL

## 2020-04-14 ENCOUNTER — PATIENT MESSAGE (OUTPATIENT)
Dept: FAMILY MEDICINE CLINIC | Facility: CLINIC | Age: 55
End: 2020-04-14

## 2020-04-14 DIAGNOSIS — N39.3 STRESS INCONTINENCE OF URINE: Primary | ICD-10-CM

## 2020-04-14 DIAGNOSIS — N30.00 ACUTE CYSTITIS WITHOUT HEMATURIA: ICD-10-CM

## 2020-04-14 PROCEDURE — 99213 OFFICE O/P EST LOW 20 MIN: CPT | Performed by: FAMILY MEDICINE

## 2020-04-14 RX ORDER — CEPHALEXIN 500 MG/1
500 CAPSULE ORAL 2 TIMES DAILY
Qty: 14 CAPSULE | Refills: 0 | Status: SHIPPED | OUTPATIENT
Start: 2020-04-14 | End: 2020-08-17 | Stop reason: ALTCHOICE

## 2020-04-14 NOTE — PROGRESS NOTES
Virtual Telephone Check-In    Jose Rafael Barr verbally consents to a Virtual/Telephone Check-In visit on 04/14/20. Patient understands and accepts financial responsibility for any deductible, co-insurance and/or co-pays associated with this service.     Du continues after treatment for UTI, then will refer to urogynecology. Possible side effect of medication with bupropion listed urinary frequency but not common.   Patient to complete urine analysis and testing prior to starting antibiotics- attempt to send

## 2020-04-14 NOTE — TELEPHONE ENCOUNTER
From: Arthur Marroquin  To: Cecily Rubio DO  Sent: 4/14/2020 3:00 PM CDT  Subject: Other    Fax at my work: [phone]. I can get the culture/UA done at the lab here if the order could be faxed. Otherwise I'll go to [facility] tomorrow morning. Thanks!

## 2020-04-14 NOTE — TELEPHONE ENCOUNTER
From: Bill Garcia  To: Daniella Gay DO  Sent: 4/14/2020 8:39 AM CDT  Subject: Non-Urgent Medical Question    I have been on the increased dose bupropion for several weeks now.  Have had issues with urinary urgency/incontinence but read that can be a quan

## 2020-04-20 ENCOUNTER — PATIENT MESSAGE (OUTPATIENT)
Dept: FAMILY MEDICINE CLINIC | Facility: CLINIC | Age: 55
End: 2020-04-20

## 2020-04-20 DIAGNOSIS — F41.9 ANXIETY: ICD-10-CM

## 2020-04-20 RX ORDER — VENLAFAXINE HYDROCHLORIDE 75 MG/1
75 CAPSULE, EXTENDED RELEASE ORAL
Qty: 30 CAPSULE | Refills: 1 | Status: SHIPPED | OUTPATIENT
Start: 2020-04-20 | End: 2020-05-07

## 2020-04-20 NOTE — TELEPHONE ENCOUNTER
From: Velia Sampson  To: Luis Willis DO  Sent: 4/20/2020 7:19 AM CDT  Subject: Prescription Question    Good morning- can you send an Rx for venlafaxine 75 mg 90-day to [facility] and a 30-day to [facility]? I'm out.  Em Leary

## 2020-04-20 NOTE — TELEPHONE ENCOUNTER
Pt requesting refill of   Requested Prescriptions     Pending Prescriptions Disp Refills   • Venlafaxine HCl ER 75 MG Oral Capsule SR 24 Hr 30 capsule 0     Sig: Take 1 capsule (75 mg total) by mouth once daily.           No protocol available, routed to pr

## 2020-04-21 ENCOUNTER — TELEPHONE (OUTPATIENT)
Dept: FAMILY MEDICINE CLINIC | Facility: CLINIC | Age: 55
End: 2020-04-21

## 2020-04-21 NOTE — TELEPHONE ENCOUNTER
Please call patient and inform her urine culture was positive for E. coli at [facility] and sensitive to all organisms including cefazolin. Patient prescribed generic Keflex  .   Urinalysis with moderate blood, positive nitrates and small leuk esterase and micros

## 2020-05-05 ENCOUNTER — PATIENT MESSAGE (OUTPATIENT)
Dept: FAMILY MEDICINE CLINIC | Facility: CLINIC | Age: 55
End: 2020-05-05

## 2020-05-05 RX ORDER — FLUCONAZOLE 150 MG/1
150 TABLET ORAL ONCE
Qty: 1 TABLET | Refills: 0 | Status: SHIPPED | OUTPATIENT
Start: 2020-05-05 | End: 2020-05-05

## 2020-05-05 NOTE — TELEPHONE ENCOUNTER
From: Abimbola Figueroa  To: Joanie Goldberg DO  Sent: 5/5/2020 8:27 AM CDT  Subject: Non-Urgent Medical Question    When the doctor Rx'd keflex for UTI she asked if I needed something for a yeast infection. I didn't think I did. Now I do.  Itching, burning, mis

## 2020-05-05 NOTE — TELEPHONE ENCOUNTER
Rx for fluconazole 150 mg sent to [address] [place], [place] [address] [place]. If symptoms not resolved in 1 week then needs evaluation. Please inform.

## 2020-05-07 DIAGNOSIS — F41.9 ANXIETY: ICD-10-CM

## 2020-05-07 RX ORDER — VENLAFAXINE HYDROCHLORIDE 75 MG/1
75 CAPSULE, EXTENDED RELEASE ORAL
Qty: 90 CAPSULE | Refills: 0 | Status: SHIPPED | OUTPATIENT
Start: 2020-05-07 | End: 2020-08-19

## 2020-05-07 NOTE — TELEPHONE ENCOUNTER
Pt requesting refill of venlafaxine  [facility]    No protocol available, routed to provider to review medication request:     Last Time Medication was Filled:  4/20/2020 to local pharmacy    Last Office Visit with Provider: 4/14/2020      Katlin cortes

## 2020-05-21 ENCOUNTER — PATIENT OUTREACH (OUTPATIENT)
Dept: FAMILY MEDICINE CLINIC | Facility: CLINIC | Age: 55
End: 2020-05-21

## 2020-06-18 DIAGNOSIS — E28.2 POLYCYSTIC OVARIES: ICD-10-CM

## 2020-06-19 NOTE — TELEPHONE ENCOUNTER
Pt requesting refill of   Requested Prescriptions     Pending Prescriptions Disp Refills   • metFORMIN HCl 850 MG Oral Tab [Pharmacy Med Name: METFORMIN HCL TABS 850MG] 180 tablet 3     Sig: TAKE 1 TABLET TWICE A DAY WITH MEALS   Protocol failed, unable to

## 2020-07-15 ENCOUNTER — APPOINTMENT (OUTPATIENT)
Dept: CT IMAGING | Facility: HOSPITAL | Age: 55
End: 2020-07-15
Attending: EMERGENCY MEDICINE
Payer: COMMERCIAL

## 2020-07-15 ENCOUNTER — HOSPITAL ENCOUNTER (EMERGENCY)
Facility: HOSPITAL | Age: 55
Discharge: HOME | End: 2020-07-15
Attending: EMERGENCY MEDICINE
Payer: COMMERCIAL

## 2020-07-15 ENCOUNTER — APPOINTMENT (OUTPATIENT)
Dept: GENERAL RADIOLOGY | Facility: HOSPITAL | Age: 55
End: 2020-07-15
Attending: EMERGENCY MEDICINE
Payer: COMMERCIAL

## 2020-07-15 ENCOUNTER — PATIENT MESSAGE (OUTPATIENT)
Dept: FAMILY MEDICINE CLINIC | Facility: CLINIC | Age: 55
End: 2020-07-15

## 2020-07-15 VITALS
WEIGHT: 222 LBS | SYSTOLIC BLOOD PRESSURE: 126 MMHG | HEART RATE: 85 BPM | BODY MASS INDEX: 35.68 KG/M2 | TEMPERATURE: 98 F | OXYGEN SATURATION: 98 % | DIASTOLIC BLOOD PRESSURE: 86 MMHG | RESPIRATION RATE: 18 BRPM | HEIGHT: 66 IN

## 2020-07-15 DIAGNOSIS — R07.89 CHEST TIGHTNESS: Primary | ICD-10-CM

## 2020-07-15 DIAGNOSIS — R06.00 DYSPNEA, UNSPECIFIED TYPE: ICD-10-CM

## 2020-07-15 LAB
ALBUMIN SERPL-MCNC: 3.9 G/DL (ref 3.4–5)
ALBUMIN/GLOB SERPL: 1.1 {RATIO} (ref 1–2)
ALP LIVER SERPL-CCNC: 137 U/L (ref 46–118)
ALT SERPL-CCNC: 26 U/L (ref 13–56)
ANION GAP SERPL CALC-SCNC: 3 MMOL/L (ref 0–18)
AST SERPL-CCNC: 22 U/L (ref 15–37)
BASOPHILS # BLD AUTO: 0.06 X10(3) UL (ref 0–0.2)
BASOPHILS NFR BLD AUTO: 1 %
BILIRUB SERPL-MCNC: 0.5 MG/DL (ref 0.1–2)
BUN BLD-MCNC: 17 MG/DL (ref 7–18)
BUN/CREAT SERPL: 17 (ref 10–20)
CALCIUM BLD-MCNC: 9.3 MG/DL (ref 8.5–10.1)
CHLORIDE SERPL-SCNC: 104 MMOL/L (ref 98–112)
CO2 SERPL-SCNC: 27 MMOL/L (ref 21–32)
CREAT BLD-MCNC: 1 MG/DL (ref 0.55–1.02)
D-DIMER: 0.78 UG/ML FEU (ref ?–0.56)
DEPRECATED RDW RBC AUTO: 46.7 FL (ref 35.1–46.3)
EOSINOPHIL # BLD AUTO: 0.07 X10(3) UL (ref 0–0.7)
EOSINOPHIL NFR BLD AUTO: 1.2 %
ERYTHROCYTE [DISTWIDTH] IN BLOOD BY AUTOMATED COUNT: 14.6 % (ref 11–15)
GLOBULIN PLAS-MCNC: 3.7 G/DL (ref 2.8–4.4)
GLUCOSE BLD-MCNC: 84 MG/DL (ref 70–99)
HCT VFR BLD AUTO: 40.6 % (ref 35–48)
HGB BLD-MCNC: 13.3 G/DL (ref 12–16)
IMM GRANULOCYTES # BLD AUTO: 0.02 X10(3) UL (ref 0–1)
IMM GRANULOCYTES NFR BLD: 0.3 %
LYMPHOCYTES # BLD AUTO: 1.75 X10(3) UL (ref 1–4)
LYMPHOCYTES NFR BLD AUTO: 29 %
M PROTEIN MFR SERPL ELPH: 7.6 G/DL (ref 6.4–8.2)
MCH RBC QN AUTO: 29.1 PG (ref 26–34)
MCHC RBC AUTO-ENTMCNC: 32.8 G/DL (ref 31–37)
MCV RBC AUTO: 88.8 FL (ref 80–100)
MONOCYTES # BLD AUTO: 0.59 X10(3) UL (ref 0.1–1)
MONOCYTES NFR BLD AUTO: 9.8 %
NEUTROPHILS # BLD AUTO: 3.55 X10 (3) UL (ref 1.5–7.7)
NEUTROPHILS # BLD AUTO: 3.55 X10(3) UL (ref 1.5–7.7)
NEUTROPHILS NFR BLD AUTO: 58.7 %
NT-PROBNP SERPL-MCNC: 258 PG/ML (ref ?–125)
OSMOLALITY SERPL CALC.SUM OF ELEC: 279 MOSM/KG (ref 275–295)
PLATELET # BLD AUTO: 190 10(3)UL (ref 150–450)
POTASSIUM SERPL-SCNC: 3.5 MMOL/L (ref 3.5–5.1)
RBC # BLD AUTO: 4.57 X10(6)UL (ref 3.8–5.3)
SARS-COV-2 RNA RESP QL NAA+PROBE: NOT DETECTED
SODIUM SERPL-SCNC: 134 MMOL/L (ref 136–145)
TROPONIN I SERPL-MCNC: <0.045 NG/ML (ref ?–0.04)
TROPONIN I SERPL-MCNC: <0.045 NG/ML (ref ?–0.04)
WBC # BLD AUTO: 6 X10(3) UL (ref 4–11)

## 2020-07-15 PROCEDURE — 85025 COMPLETE CBC W/AUTO DIFF WBC: CPT | Performed by: EMERGENCY MEDICINE

## 2020-07-15 PROCEDURE — 80053 COMPREHEN METABOLIC PANEL: CPT | Performed by: EMERGENCY MEDICINE

## 2020-07-15 PROCEDURE — 83880 ASSAY OF NATRIURETIC PEPTIDE: CPT | Performed by: EMERGENCY MEDICINE

## 2020-07-15 PROCEDURE — 36415 COLL VENOUS BLD VENIPUNCTURE: CPT

## 2020-07-15 PROCEDURE — 71275 CT ANGIOGRAPHY CHEST: CPT | Performed by: EMERGENCY MEDICINE

## 2020-07-15 PROCEDURE — 71045 X-RAY EXAM CHEST 1 VIEW: CPT | Performed by: EMERGENCY MEDICINE

## 2020-07-15 PROCEDURE — 93010 ELECTROCARDIOGRAM REPORT: CPT

## 2020-07-15 PROCEDURE — 99284 EMERGENCY DEPT VISIT MOD MDM: CPT

## 2020-07-15 PROCEDURE — 85379 FIBRIN DEGRADATION QUANT: CPT | Performed by: EMERGENCY MEDICINE

## 2020-07-15 PROCEDURE — 99285 EMERGENCY DEPT VISIT HI MDM: CPT

## 2020-07-15 PROCEDURE — 84484 ASSAY OF TROPONIN QUANT: CPT | Performed by: EMERGENCY MEDICINE

## 2020-07-15 PROCEDURE — 93005 ELECTROCARDIOGRAM TRACING: CPT

## 2020-07-15 NOTE — ED PROVIDER NOTES
Patient Seen in: [facility] Emergency Department      History   Patient presents with:  Chest Pain Angina    Stated Complaint: ANNETTA / Karey Rodney    HPI    Patient's chief complaint today is that she cannot take a deep breath.   She states that this been going non-toxic  Head: Normocephalic and atraumatic. Eyes: EOM are normal. Pupils are equal, round, and reactive to light. Neck: Normal range of motion. Neck supple. No JVD present. Cardiovascular: Normal rate and regular rhythm.     Pulmonary/Chest: Ef GOLD     EKG    Rate, intervals and axes as noted on EKG Report. Rate: 84  Rhythm: Sinus Rhythm  Reading: Sinus rhythm without acute ischemic changes, essentially unchanged compared to previous EKG. Prior records reviewed.   About 2 years ago

## 2020-07-16 LAB
ATRIAL RATE: 84 BPM
P AXIS: 44 DEGREES
P-R INTERVAL: 162 MS
Q-T INTERVAL: 374 MS
QRS DURATION: 72 MS
QTC CALCULATION (BEZET): 441 MS
R AXIS: 37 DEGREES
T AXIS: 33 DEGREES
VENTRICULAR RATE: 84 BPM

## 2020-07-16 RX ORDER — BUPROPION HYDROCHLORIDE 300 MG/1
300 TABLET ORAL DAILY
Qty: 90 TABLET | Refills: 0 | Status: SHIPPED | OUTPATIENT
Start: 2020-07-16 | End: 2020-10-21

## 2020-07-16 RX ORDER — BUPROPION HYDROCHLORIDE 150 MG/1
150 TABLET ORAL DAILY
Qty: 30 TABLET | Refills: 0 | Status: SHIPPED | OUTPATIENT
Start: 2020-07-16 | End: 2020-08-17 | Stop reason: SINTOL

## 2020-07-16 NOTE — TELEPHONE ENCOUNTER
From: Mirna Chawla  To: Biju Barrientos DO  Sent: 7/15/2020 6:34 PM CDT  Subject: Prescription Question    I need a refill of bupropion 450 mg - but NOT that strength. [facility] charged $140 for 90-day supply.  Can we do 300s and 150s, or all 150s but

## 2020-07-16 NOTE — TELEPHONE ENCOUNTER
Pt requesting refill of   Requested Prescriptions     Pending Prescriptions Disp Refills   • buPROPion HCl ER, XL, 150 MG Oral Tablet 24 Hr 30 tablet 0     Sig: Take 1 tablet (150 mg total) by mouth daily.  Take with buproprion XL 300mg   • buPROPion HCl ER

## 2020-07-20 ENCOUNTER — TELEPHONE (OUTPATIENT)
Dept: FAMILY MEDICINE CLINIC | Facility: CLINIC | Age: 55
End: 2020-07-20

## 2020-07-20 NOTE — TELEPHONE ENCOUNTER
LMOM to return call to the office. Provided pt office phone [phone] along with office hours given.     LM for to check how patient is doing and to make a follow up apt   My chart message sent

## 2020-08-17 ENCOUNTER — OFFICE VISIT (OUTPATIENT)
Dept: FAMILY MEDICINE CLINIC | Facility: CLINIC | Age: 55
End: 2020-08-17
Payer: COMMERCIAL

## 2020-08-17 VITALS
TEMPERATURE: 98 F | OXYGEN SATURATION: 98 % | WEIGHT: 226.63 LBS | DIASTOLIC BLOOD PRESSURE: 70 MMHG | RESPIRATION RATE: 18 BRPM | SYSTOLIC BLOOD PRESSURE: 116 MMHG | HEART RATE: 89 BPM | HEIGHT: 66 IN | BODY MASS INDEX: 36.42 KG/M2

## 2020-08-17 DIAGNOSIS — Z23 NEED FOR PNEUMOCOCCAL VACCINATION: ICD-10-CM

## 2020-08-17 DIAGNOSIS — Z87.09 HISTORY OF DYSPNEA: Primary | ICD-10-CM

## 2020-08-17 DIAGNOSIS — F41.9 ANXIETY: ICD-10-CM

## 2020-08-17 DIAGNOSIS — R60.9 DEPENDENT EDEMA: ICD-10-CM

## 2020-08-17 DIAGNOSIS — R07.89 CHEST TIGHTNESS: ICD-10-CM

## 2020-08-17 DIAGNOSIS — R13.19 ESOPHAGEAL DYSPHAGIA: ICD-10-CM

## 2020-08-17 DIAGNOSIS — Z12.31 ENCOUNTER FOR SCREENING MAMMOGRAM FOR MALIGNANT NEOPLASM OF BREAST: ICD-10-CM

## 2020-08-17 PROCEDURE — 99214 OFFICE O/P EST MOD 30 MIN: CPT | Performed by: FAMILY MEDICINE

## 2020-08-17 PROCEDURE — 90732 PPSV23 VACC 2 YRS+ SUBQ/IM: CPT | Performed by: FAMILY MEDICINE

## 2020-08-17 PROCEDURE — 3008F BODY MASS INDEX DOCD: CPT | Performed by: FAMILY MEDICINE

## 2020-08-17 PROCEDURE — 3078F DIAST BP <80 MM HG: CPT | Performed by: FAMILY MEDICINE

## 2020-08-17 PROCEDURE — 90471 IMMUNIZATION ADMIN: CPT | Performed by: FAMILY MEDICINE

## 2020-08-17 PROCEDURE — 3074F SYST BP LT 130 MM HG: CPT | Performed by: FAMILY MEDICINE

## 2020-08-17 RX ORDER — CLONAZEPAM 0.5 MG/1
0.25 TABLET ORAL 2 TIMES DAILY PRN
Qty: 90 TABLET | Refills: 0 | Status: SHIPPED | OUTPATIENT
Start: 2020-08-17 | End: 2021-01-13

## 2020-08-17 NOTE — PATIENT INSTRUCTIONS
As of October 6th 2014, the Drug Enforcement Agency [facility]) is reclassifying all hydrocodone combination medications from Schedule III to Schedule II. This includes medications such as Norco, Vicodin, Lortab, Zohydro, and Vicoprofen.      What this means for by excess fluid that has collected in the tissues. Extra fluid in the body settles in the lowest part because of gravity. This is why the legs and feet are most affected.   Some of the causes for edema include:  · Disease of the heart such as congestive hea collected in your leg. Talk with your provider about using support stockings to stop daytime leg swelling. · If your provider says that heart disease is causing your leg swelling, follow a low-salt diet to stop extra fluid from staying in your body.  You m

## 2020-08-17 NOTE — PROGRESS NOTES
CHIEF COMPLAINT: Patient presents with:  Medication Follow-Up  ER F/U: Seen at [facility] for SOB       HPI:     Royce Barreto is a 63year old female presents for follow-up ED shortness of breath.   Patient was seen on 7/15/2020 a month ago at [facility] ER by  Denies depressed mood or anhedonia. Denies crying spells, SI or HI.         HISTORY:  Past Medical History:   Diagnosis Date   • Acute bronchitis    • ANXIETY    • Arthritis    • DEPRESSION    • Hx of pregnancy 1998, 2003    x2   • HYPERTENSION    • Lumbag 112 MCG Oral Tab TAKE 1 TABLET BEFORE BREAKFAST 90 tablet 3   • LISINOPRIL 20 MG Oral Tab TAKE 1 TABLET DAILY 90 tablet 3   • baclofen 20 MG Oral Tab TAKE 1 TABLET DAILY IN THE EVENING 90 tablet 3   • FUROSEMIDE 20 MG Oral Tab TAKE 1 TABLET TWICE A  well groomed. Physical Exam  GENERAL: well developed, well nourished,in no apparent distress  EYES; PERRLA, EOM-i B/L.  Sclera clear and non icteric bilateral  SKIN: no rashes,no suspicious lesions  HEENT: atraumatic, normocephalic,ears and throat are rosalino 07/15/2020 13.3    • HCT 07/15/2020 40.6    • PLT 07/15/2020 190.0    • MCV 07/15/2020 88.8    • MCH 07/15/2020 29.1    • MCHC 07/15/2020 32.8    • RDW 07/15/2020 14.6    • RDW-SD 07/15/2020 46.7*   • Neutrophil Absolute Prel* 07/15/2020 3.55    • Neutroph clonazePAM (KLONOPIN) 0.5 MG Oral Tab 90 tablet 0     Sig: Take 0.5 tablets (0.25 mg total) by mouth 2 (two) times daily as needed for Anxiety (causes sedation. no driving 8h ingestion. avoid Etoh.).        Health Maintenance:  Pneumococcal PPSV23 Medium Ri in about 3 months (around 11/17/2020) for annual physical, sooner if needed. Abraham Ruffin

## 2020-08-18 NOTE — TELEPHONE ENCOUNTER
Pt requesting refill of   Requested Prescriptions     Pending Prescriptions Disp Refills   • VENLAFAXINE HCL ER 75 MG Oral Capsule SR 24 Hr [Pharmacy Med Name: VENLAFAXINE HCL ER CAPS 75MG] 90 capsule 3     Sig: TAKE 1 CAPSULE DAILY WITH VENLAFAXINE

## 2020-08-19 ENCOUNTER — TELEPHONE (OUTPATIENT)
Dept: FAMILY MEDICINE CLINIC | Facility: CLINIC | Age: 55
End: 2020-08-19

## 2020-08-19 RX ORDER — VENLAFAXINE HYDROCHLORIDE 75 MG/1
CAPSULE, EXTENDED RELEASE ORAL
Qty: 90 CAPSULE | Refills: 3 | Status: SHIPPED | OUTPATIENT
Start: 2020-08-19 | End: 2020-08-19

## 2020-08-19 NOTE — TELEPHONE ENCOUNTER
[facility] left message with a few concerns regarding two medications the patient was prescribed. They would like a call back to discuss.

## 2020-08-19 NOTE — TELEPHONE ENCOUNTER
Call placed to pharmacy. They need to verify okay to fill Venlafaxine and Trazodone, due to increase risk of Serotonin Syndrome when taken concurrently. Dr. Royal Bass is aware. Patient is stable on these medications.  Provided verbal okay to proceed with ruddy

## 2020-08-19 NOTE — TELEPHONE ENCOUNTER
Pt requesting refill of   Requested Prescriptions     Pending Prescriptions Disp Refills   • baclofen 20 MG Oral Tab [Pharmacy Med Name: BACLOFEN TABS 20MG] 90 tablet 3     Sig: TAKE 1 TABLET DAILY IN THE EVENING   • FUROSEMIDE 20 MG Oral Tab [Pharmacy Med Statement Selected

## 2020-08-24 DIAGNOSIS — G47.00 INSOMNIA, UNSPECIFIED TYPE: ICD-10-CM

## 2020-08-24 RX ORDER — TRAZODONE HYDROCHLORIDE 100 MG/1
TABLET ORAL
Qty: 10 TABLET | Refills: 0 | Status: SHIPPED | OUTPATIENT
Start: 2020-08-24 | End: 2020-08-28

## 2020-08-24 NOTE — TELEPHONE ENCOUNTER
Pt requesting refill of   Requested Prescriptions     Pending Prescriptions Disp Refills   • traZODone HCl 100 MG Oral Tab 10 tablet 0     Sig: TAKE 1 TABLET BY MOUTH NIGHTLY AS NEEDED FOR SLEEP     No protocol available, routed to provider to review medic

## 2020-08-27 ENCOUNTER — PATIENT MESSAGE (OUTPATIENT)
Dept: FAMILY MEDICINE CLINIC | Facility: CLINIC | Age: 55
End: 2020-08-27

## 2020-08-27 DIAGNOSIS — G47.00 INSOMNIA, UNSPECIFIED TYPE: ICD-10-CM

## 2020-08-28 RX ORDER — TRAZODONE HYDROCHLORIDE 100 MG/1
TABLET ORAL
Qty: 90 TABLET | Refills: 0 | Status: SHIPPED | OUTPATIENT
Start: 2020-08-28 | End: 2020-12-22

## 2020-08-28 NOTE — TELEPHONE ENCOUNTER
Patient informed by insurance, that can receive 90 day supply at [facility] rather than mail-order, and having issues getting it filled at [address] Pended script for 90 day for Samara.

## 2020-08-28 NOTE — TELEPHONE ENCOUNTER
From: Aleksandra Rodríguez  To: Jose Grier DO  Sent: 8/27/2020 7:40 PM CDT  Subject: Prescription Question    OMG!!!!!! I finally got my trazodone Rx from [facility] today - for only 10 tablets. What?!  The rep told me they got the 90-tablet rx followed b

## 2020-11-04 ENCOUNTER — PATIENT MESSAGE (OUTPATIENT)
Dept: FAMILY MEDICINE CLINIC | Facility: CLINIC | Age: 55
End: 2020-11-04

## 2020-11-04 DIAGNOSIS — M54.50 BILATERAL LOW BACK PAIN WITHOUT SCIATICA, UNSPECIFIED CHRONICITY: ICD-10-CM

## 2020-11-05 NOTE — TELEPHONE ENCOUNTER
From: Cristina Garner  To: Kaleb Hanson DO  Sent: 11/4/2020 7:57 PM CST  Subject: Prescription Question    I need a 90-day Rx for Baclofen and just wanted to make sure it went to my [facility], NOT [facility]. Thanks!

## 2020-11-09 RX ORDER — BACLOFEN 20 MG/1
TABLET ORAL
Qty: 90 TABLET | Refills: 1 | Status: SHIPPED | OUTPATIENT
Start: 2020-11-09 | End: 2021-01-13

## 2020-11-09 NOTE — TELEPHONE ENCOUNTER
Pt requesting refill of   Requested Prescriptions     Pending Prescriptions Disp Refills   • baclofen 20 MG Oral Tab 90 tablet 1     Sig: TAKE 1 TABLET DAILY IN THE EVENING        No protocol available, routed to provider to review medication request:

## 2020-11-15 DIAGNOSIS — F33.42 RECURRENT MAJOR DEPRESSIVE DISORDER, IN FULL REMISSION (HCC): ICD-10-CM

## 2020-11-15 DIAGNOSIS — F41.9 ANXIETY: ICD-10-CM

## 2020-11-16 RX ORDER — VENLAFAXINE HYDROCHLORIDE 150 MG/1
150 CAPSULE, EXTENDED RELEASE ORAL
Qty: 90 CAPSULE | Refills: 3 | OUTPATIENT
Start: 2020-11-16

## 2020-12-08 ENCOUNTER — OFFICE VISIT (OUTPATIENT)
Dept: FAMILY MEDICINE CLINIC | Facility: CLINIC | Age: 55
End: 2020-12-08
Payer: COMMERCIAL

## 2020-12-08 VITALS
SYSTOLIC BLOOD PRESSURE: 128 MMHG | TEMPERATURE: 98 F | RESPIRATION RATE: 18 BRPM | BODY MASS INDEX: 35.7 KG/M2 | OXYGEN SATURATION: 99 % | HEART RATE: 79 BPM | HEIGHT: 66.5 IN | WEIGHT: 224.81 LBS | DIASTOLIC BLOOD PRESSURE: 72 MMHG

## 2020-12-08 DIAGNOSIS — Z12.31 SCREENING MAMMOGRAM, ENCOUNTER FOR: ICD-10-CM

## 2020-12-08 DIAGNOSIS — F32.A ANXIETY AND DEPRESSION: ICD-10-CM

## 2020-12-08 DIAGNOSIS — E78.00 PURE HYPERCHOLESTEROLEMIA: ICD-10-CM

## 2020-12-08 DIAGNOSIS — Z78.9 VEGETARIAN DIET: ICD-10-CM

## 2020-12-08 DIAGNOSIS — Z13.0 SCREENING FOR IRON DEFICIENCY ANEMIA: ICD-10-CM

## 2020-12-08 DIAGNOSIS — I10 ESSENTIAL HYPERTENSION: ICD-10-CM

## 2020-12-08 DIAGNOSIS — Z13.0 SCREENING FOR ENDOCRINE, NUTRITIONAL, METABOLIC AND IMMUNITY DISORDER: ICD-10-CM

## 2020-12-08 DIAGNOSIS — Z00.00 ANNUAL PHYSICAL EXAM: Primary | ICD-10-CM

## 2020-12-08 DIAGNOSIS — E89.0 POSTOPERATIVE HYPOTHYROIDISM: ICD-10-CM

## 2020-12-08 DIAGNOSIS — Z13.228 SCREENING FOR ENDOCRINE, NUTRITIONAL, METABOLIC AND IMMUNITY DISORDER: ICD-10-CM

## 2020-12-08 DIAGNOSIS — E28.2 POLYCYSTIC OVARIES: ICD-10-CM

## 2020-12-08 DIAGNOSIS — F41.9 ANXIETY AND DEPRESSION: ICD-10-CM

## 2020-12-08 DIAGNOSIS — Z13.29 SCREENING FOR ENDOCRINE, NUTRITIONAL, METABOLIC AND IMMUNITY DISORDER: ICD-10-CM

## 2020-12-08 DIAGNOSIS — Z13.21 SCREENING FOR ENDOCRINE, NUTRITIONAL, METABOLIC AND IMMUNITY DISORDER: ICD-10-CM

## 2020-12-08 DIAGNOSIS — J81.1 CHRONIC PULMONARY EDEMA: ICD-10-CM

## 2020-12-08 PROBLEM — F33.1 MODERATE EPISODE OF RECURRENT MAJOR DEPRESSIVE DISORDER (HCC): Status: RESOLVED | Noted: 2020-03-10 | Resolved: 2020-12-08

## 2020-12-08 PROCEDURE — 3078F DIAST BP <80 MM HG: CPT | Performed by: FAMILY MEDICINE

## 2020-12-08 PROCEDURE — 3074F SYST BP LT 130 MM HG: CPT | Performed by: FAMILY MEDICINE

## 2020-12-08 PROCEDURE — 3008F BODY MASS INDEX DOCD: CPT | Performed by: FAMILY MEDICINE

## 2020-12-08 PROCEDURE — 99396 PREV VISIT EST AGE 40-64: CPT | Performed by: FAMILY MEDICINE

## 2020-12-08 NOTE — PROGRESS NOTES
REASON FOR VISIT:    Navin Stover is a 63year old female who presents for an [address]. Patient presents for recheck of hypertension.  Pt has been taking medications as instructed, no medication side effects, home BP monitoring in the ra msg left to call nurse   12/08/20 : 224 lb 12.8 oz (102 kg)  08/17/20 : 226 lb 9.6 oz (102.8 kg)  07/15/20 : 222 lb (100.7 kg)  03/10/20 : 226 lb (102.5 kg)  01/06/20 : 225 lb (102.1 kg)  07/08/19 : 218 lb 3.2 oz (99 kg)      Patient Active Problem List:     [facility] ([facility] • Ixekizumab (TALTZ) 80 MG/ML Subcutaneous Solution Auto-injector Inject 80 mg into the skin every 28 days.      • LEVOTHYROXINE SODIUM 112 MCG Oral Tab TAKE 1 TABLET BEFORE BREAKFAST 90 tablet 3   • LISINOPRIL 20 MG Oral Tab TAKE 1 TABLET DAILY 90 tablet 3 AST 18 10/04/2019    AST 20 02/27/2019     Lab Results   Component Value Date    ALT 26 07/15/2020    ALT 13 10/04/2019    ALT 22 02/27/2019     Lab Results   Component Value Date    TSH 0.65 03/10/2020    TSH 0.861 02/27/2019    TSH 0.644 05/30/2018 Obesity Screening Screen all adults annually Body mass index is 35.74 kg/m².       Preventive Services for Which Recommendations Vary with Risk Recommendation Internal Lab or Procedure External Lab or Procedure   Cholesterol Screening Recommended screening LDL  Annually LDL-CHOLESTEROL (mg/dL (calc))   Date Value   10/04/2019 133 (H)    No flowsheet data found. Dilated Eye exam  Annually No flowsheet data found. No flowsheet data found.     Asthma  (Annually between Nov. 1 & Dec. 31)    Date of last AAP • Pantoprazole Sodium 40 MG Oral Tab EC TAKE 1 TABLET BY MOUTH EVERY DAY BEFORE BREAKFAST 90 tablet 3   • LEFLUNOMIDE 20 MG Oral Tab TAKE 1 TABLET BY MOUTH EVERY DAY 30 tablet 0   • loratadine 10 MG Oral Tab Take 10 mg by mouth daily.      • Cholecalciferol SKIN: denies any unusual skin lesions  EYES: denies blurred vision or double vision  HEENT: denies nasal congestion, sinus pain or ST  LUNGS: denies shortness of breath with exertion  CARDIOVASCULAR: denies chest pain on exertion  GI: denies abdominal pain 1. Annual physical exam  BMI 35  encouraged weight loss recommended. Increase protein in diet. Vegetarian  -Encourage healthy diet of whole food and avoid processed food and sugary drinks and sodas.   Diet should include lean meats and vegetables including Continue FU with rheumatologist    The patient indicates understanding of these issues and agrees to the plan.   Return in about 6 weeks (around 1/19/2021) for blood pressure, medication monitoring or in 1 month if labs abnormal.    Diet counseling perfomed

## 2020-12-08 NOTE — PATIENT INSTRUCTIONS
As of October 6th 2014, the Drug Enforcement Agency [facility]) is reclassifying all hydrocodone combination medications from Schedule III to Schedule II. This includes medications such as Norco, Vicodin, Lortab, Zohydro, and Vicoprofen.    What this means for yo food and sugary drinks and sodas. Diet should include lean meats and vegetables including 5-7 servings of fruit and vegetables total in 1 day.   Never skip breakfast.  -Encouraged exercise 30 minutes to 60 minutes 3-5 times weekly for 150minutes or more to

## 2020-12-09 RX ORDER — LISINOPRIL 20 MG/1
20 TABLET ORAL DAILY
Qty: 90 TABLET | Refills: 3 | Status: SHIPPED | OUTPATIENT
Start: 2020-12-09 | End: 2021-09-03

## 2020-12-09 RX ORDER — PANTOPRAZOLE SODIUM 40 MG/1
TABLET, DELAYED RELEASE ORAL
Qty: 90 TABLET | Refills: 3 | Status: SHIPPED | OUTPATIENT
Start: 2020-12-09 | End: 2021-09-03

## 2020-12-09 RX ORDER — LEVOTHYROXINE SODIUM 112 UG/1
112 TABLET ORAL
Qty: 90 TABLET | Refills: 3 | Status: SHIPPED | OUTPATIENT
Start: 2020-12-09 | End: 2021-09-03

## 2020-12-09 NOTE — TELEPHONE ENCOUNTER
Pt requesting refill of   Requested Prescriptions     Pending Prescriptions Disp Refills   • Pantoprazole Sodium 40 MG Oral Tab EC 90 tablet 3     Sig: TAKE 1 TABLET BY MOUTH EVERY DAY BEFORE BREAKFAST   • Levothyroxine Sodium 112 MCG Oral Tab 90 tablet 3

## 2020-12-22 DIAGNOSIS — G47.00 INSOMNIA, UNSPECIFIED TYPE: ICD-10-CM

## 2020-12-22 RX ORDER — TRAZODONE HYDROCHLORIDE 100 MG/1
TABLET ORAL
Qty: 30 TABLET | Refills: 11 | Status: SHIPPED | OUTPATIENT
Start: 2020-12-22 | End: 2020-12-31

## 2020-12-22 NOTE — TELEPHONE ENCOUNTER
Pt requesting refill of   Requested Prescriptions     Pending Prescriptions Disp Refills   • traZODone HCl 100 MG Oral Tab 90 tablet 0     Sig: TAKE 1 TABLET BY MOUTH NIGHTLY AS NEEDED FOR SLEEP        No protocol available, routed to provider to review me

## 2021-01-07 LAB
ABSOLUTE BASOPHILS: 41 CELLS/UL (ref 0–200)
ABSOLUTE EOSINOPHILS: 20 CELLS/UL (ref 15–500)
ABSOLUTE LYMPHOCYTES: 1156 CELLS/UL (ref 850–3900)
ABSOLUTE MONOCYTES: 296 CELLS/UL (ref 200–950)
ABSOLUTE NEUTROPHILS: 1887 CELLS/UL (ref 1500–7800)
ALBUMIN/GLOBULIN RATIO: 1.9 (CALC) (ref 1–2.5)
ALBUMIN: 4.2 G/DL (ref 3.6–5.1)
ALKALINE PHOSPHATASE: 120 U/L (ref 37–153)
ALT: 16 U/L (ref 6–29)
AST: 22 U/L (ref 10–35)
BASOPHILS: 1.2 %
BILIRUBIN, TOTAL: 0.5 MG/DL (ref 0.2–1.2)
BUN: 17 MG/DL (ref 7–25)
CALCIUM: 9.5 MG/DL (ref 8.6–10.4)
CARBON DIOXIDE: 30 MMOL/L (ref 20–32)
CHLORIDE: 101 MMOL/L (ref 98–110)
CHOL/HDLC RATIO: 5.2 (CALC)
CHOLESTEROL, TOTAL: 256 MG/DL
CREATININE: 0.83 MG/DL (ref 0.5–1.05)
EGFR IF AFRICN AM: 91 ML/MIN/1.73M2
EGFR IF NONAFRICN AM: 79 ML/MIN/1.73M2
EOSINOPHILS: 0.6 %
GLOBULIN: 2.2 G/DL (CALC) (ref 1.9–3.7)
GLUCOSE: 94 MG/DL (ref 65–99)
HDL CHOLESTEROL: 49 MG/DL
HEMATOCRIT: 40.1 % (ref 35–45)
HEMOGLOBIN: 13.5 G/DL (ref 11.7–15.5)
LDL-CHOLESTEROL: 175 MG/DL (CALC)
LYMPHOCYTES: 34 %
MCH: 29.3 PG (ref 27–33)
MCHC: 33.7 G/DL (ref 32–36)
MCV: 87.2 FL (ref 80–100)
MONOCYTES: 8.7 %
MPV: 12.8 FL (ref 7.5–12.5)
NEUTROPHILS: 55.5 %
NON-HDL CHOLESTEROL: 207 MG/DL (CALC)
PLATELET COUNT: 173 THOUSAND/UL (ref 140–400)
POTASSIUM: 4.7 MMOL/L (ref 3.5–5.3)
PROTEIN, TOTAL: 6.4 G/DL (ref 6.1–8.1)
RDW: 13.2 % (ref 11–15)
RED BLOOD CELL COUNT: 4.6 MILLION/UL (ref 3.8–5.1)
SODIUM: 138 MMOL/L (ref 135–146)
T4, FREE: 1.2 NG/DL (ref 0.8–1.8)
TRIGLYCERIDES: 168 MG/DL
TSH: 0.49 MIU/L (ref 0.4–4.5)
VITAMIN B12: 737 PG/ML (ref 200–1100)
VITAMIN D, 25-OH, TOTAL: 75 NG/ML (ref 30–100)
WHITE BLOOD CELL COUNT: 3.4 THOUSAND/UL (ref 3.8–10.8)

## 2021-01-13 ENCOUNTER — TELEMEDICINE (OUTPATIENT)
Dept: FAMILY MEDICINE CLINIC | Facility: CLINIC | Age: 56
End: 2021-01-13

## 2021-01-13 VITALS — SYSTOLIC BLOOD PRESSURE: 137 MMHG | DIASTOLIC BLOOD PRESSURE: 89 MMHG

## 2021-01-13 DIAGNOSIS — F33.42 RECURRENT MAJOR DEPRESSIVE DISORDER, IN FULL REMISSION (HCC): ICD-10-CM

## 2021-01-13 DIAGNOSIS — I10 ESSENTIAL HYPERTENSION: ICD-10-CM

## 2021-01-13 DIAGNOSIS — E78.2 MIXED HYPERLIPIDEMIA: ICD-10-CM

## 2021-01-13 DIAGNOSIS — F41.9 ANXIETY: ICD-10-CM

## 2021-01-13 DIAGNOSIS — Z78.9 VEGETARIAN DIET: ICD-10-CM

## 2021-01-13 DIAGNOSIS — M54.50 BILATERAL LOW BACK PAIN WITHOUT SCIATICA, UNSPECIFIED CHRONICITY: ICD-10-CM

## 2021-01-13 DIAGNOSIS — I10 ESSENTIAL HYPERTENSION: Primary | ICD-10-CM

## 2021-01-13 DIAGNOSIS — R60.0 BILATERAL LOWER EXTREMITY EDEMA: ICD-10-CM

## 2021-01-13 DIAGNOSIS — J81.1 CHRONIC PULMONARY EDEMA: ICD-10-CM

## 2021-01-13 PROCEDURE — 3079F DIAST BP 80-89 MM HG: CPT | Performed by: FAMILY MEDICINE

## 2021-01-13 PROCEDURE — 99213 OFFICE O/P EST LOW 20 MIN: CPT | Performed by: FAMILY MEDICINE

## 2021-01-13 PROCEDURE — 3075F SYST BP GE 130 - 139MM HG: CPT | Performed by: FAMILY MEDICINE

## 2021-01-13 RX ORDER — VENLAFAXINE HYDROCHLORIDE 75 MG/1
75 CAPSULE, EXTENDED RELEASE ORAL DAILY
Qty: 90 CAPSULE | Refills: 2 | Status: SHIPPED | OUTPATIENT
Start: 2021-01-13

## 2021-01-13 RX ORDER — BACLOFEN 20 MG/1
TABLET ORAL
Qty: 90 TABLET | Refills: 1 | Status: SHIPPED | OUTPATIENT
Start: 2021-01-13 | End: 2021-06-22

## 2021-01-13 RX ORDER — FLUTICASONE PROPIONATE 50 MCG
1 SPRAY, SUSPENSION (ML) NASAL 2 TIMES DAILY
Qty: 3 BOTTLE | Refills: 3 | Status: SHIPPED | OUTPATIENT
Start: 2021-01-13

## 2021-01-13 RX ORDER — BUPROPION HYDROCHLORIDE 300 MG/1
300 TABLET ORAL DAILY
Qty: 90 TABLET | Refills: 2 | Status: SHIPPED | OUTPATIENT
Start: 2021-01-13 | End: 2021-10-13

## 2021-01-13 RX ORDER — VENLAFAXINE HYDROCHLORIDE 150 MG/1
150 CAPSULE, EXTENDED RELEASE ORAL
Qty: 90 CAPSULE | Refills: 1 | Status: SHIPPED | OUTPATIENT
Start: 2021-01-13 | End: 2021-08-04

## 2021-01-13 RX ORDER — CLONAZEPAM 0.5 MG/1
0.25 TABLET ORAL 2 TIMES DAILY PRN
Qty: 90 TABLET | Refills: 0 | Status: SHIPPED | OUTPATIENT
Start: 2021-01-13 | End: 2021-08-11

## 2021-01-13 RX ORDER — POTASSIUM CHLORIDE 20 MEQ/1
20 TABLET, EXTENDED RELEASE ORAL DAILY
Qty: 90 TABLET | Refills: 3 | Status: SHIPPED | OUTPATIENT
Start: 2021-01-13 | End: 2022-01-03

## 2021-01-13 RX ORDER — FUROSEMIDE 20 MG/1
20 TABLET ORAL 2 TIMES DAILY
Qty: 180 TABLET | Refills: 3 | Status: SHIPPED | OUTPATIENT
Start: 2021-01-13 | End: 2022-01-03

## 2021-01-13 NOTE — TELEPHONE ENCOUNTER
Pt requesting refill of   Requested Prescriptions     Pending Prescriptions Disp Refills   • Potassium Chloride ER 20 MEQ Oral Tab CR 90 tablet 3     Sig: Take 1 tablet (20 mEq total) by mouth daily.      No protocol available, routed to provider to review

## 2021-01-13 NOTE — PROGRESS NOTES
Due to COVID-19 ACTION PLAN, the patient's office visit was converted to a video visit. Time Spent: 20 mins    Subjective     HPI:   Mirna Chawla is a 63year old female who presents for discuss labs/blood pressure check.   Cholesterol moderately elevated BASOPHILS 01/06/2021 41    • NEUTROPHILS 01/06/2021 55.5    • LYMPHOCYTES 01/06/2021 34.0    • MONOCYTES 01/06/2021 8.7    • EOSINOPHILS 01/06/2021 0.6    • BASOPHILS 01/06/2021 1.2    • GLUCOSE 01/06/2021 94    • UREA NITROGEN (BUN) 01/06/2021 17    • CRE 07/15/2020 17.0    • Calcium, Total 07/15/2020 9.3    • Calculated Osmolality 07/15/2020 279    • GFR, Non- 07/15/2020 63    • GFR, -American 07/15/2020 73    • AST 07/15/2020 22    • ALT 07/15/2020 26    • Alkaline Phosphatase 07/15 annual physical fasting labs 12/2021    Bilateral low back pain without sciatica, unspecified chronicity  -     baclofen 20 MG Oral Tab; TAKE 1 TABLET DAILY IN THE EVENING as needed  No current flares    Anxiety  -     Venlafaxine HCl  MG Oral Capsul Patient/Caregiver Education: Patient/Caregiver Education: There are no barriers to learning. Medical education done. Outcome: Patient verbalizes understanding.  Patient is notified to call with any questions, complications, allergies, or worsening or

## 2021-02-01 DIAGNOSIS — E28.2 POLYCYSTIC OVARIES: ICD-10-CM

## 2021-02-02 NOTE — TELEPHONE ENCOUNTER
Pt requesting refill of   Requested Prescriptions     Pending Prescriptions Disp Refills   • metFORMIN HCl 850 MG Oral Tab 180 tablet 3     Sig: TAKE 1 TABLET TWICE A DAY WITH MEALS       Protocol failed, unable to approve, due to per protocol is due for l

## 2021-06-20 DIAGNOSIS — M54.50 BILATERAL LOW BACK PAIN WITHOUT SCIATICA, UNSPECIFIED CHRONICITY: ICD-10-CM

## 2021-06-22 RX ORDER — BACLOFEN 20 MG/1
TABLET ORAL
Qty: 90 TABLET | Refills: 0 | Status: SHIPPED | OUTPATIENT
Start: 2021-06-22

## 2021-07-31 DIAGNOSIS — F41.9 ANXIETY: ICD-10-CM

## 2021-07-31 DIAGNOSIS — F33.42 RECURRENT MAJOR DEPRESSIVE DISORDER, IN FULL REMISSION (HCC): ICD-10-CM

## 2021-08-02 RX ORDER — VENLAFAXINE HYDROCHLORIDE 150 MG/1
CAPSULE, EXTENDED RELEASE ORAL
Qty: 90 CAPSULE | Refills: 1 | Status: SHIPPED | OUTPATIENT
Start: 2021-08-02

## 2021-08-02 NOTE — TELEPHONE ENCOUNTER
Pt requesting refill of   Requested Prescriptions     Pending Prescriptions Disp Refills   • VENLAFAXINE 150 MG Oral Capsule SR 24 Hr [Pharmacy Med Name: VENLAFAXINE 150MG ER CAPSULES] 90 capsule 1     Sig: TAKE 1 CAPSULE(150 MG) BY MOUTH EVERY DAY

## 2021-08-04 ENCOUNTER — OFFICE VISIT (OUTPATIENT)
Dept: FAMILY MEDICINE CLINIC | Facility: CLINIC | Age: 56
End: 2021-08-04
Payer: COMMERCIAL

## 2021-08-04 ENCOUNTER — TELEPHONE (OUTPATIENT)
Dept: FAMILY MEDICINE CLINIC | Facility: CLINIC | Age: 56
End: 2021-08-04

## 2021-08-04 VITALS
RESPIRATION RATE: 18 BRPM | BODY MASS INDEX: 35.73 KG/M2 | WEIGHT: 225 LBS | TEMPERATURE: 98 F | SYSTOLIC BLOOD PRESSURE: 128 MMHG | DIASTOLIC BLOOD PRESSURE: 74 MMHG | HEIGHT: 66.5 IN | OXYGEN SATURATION: 96 % | HEART RATE: 81 BPM

## 2021-08-04 DIAGNOSIS — G56.02 CARPAL TUNNEL SYNDROME, LEFT: ICD-10-CM

## 2021-08-04 DIAGNOSIS — Z01.818 PRE-OP EXAMINATION: Primary | ICD-10-CM

## 2021-08-04 PROCEDURE — 3078F DIAST BP <80 MM HG: CPT | Performed by: FAMILY MEDICINE

## 2021-08-04 PROCEDURE — 93000 ELECTROCARDIOGRAM COMPLETE: CPT | Performed by: FAMILY MEDICINE

## 2021-08-04 PROCEDURE — 3008F BODY MASS INDEX DOCD: CPT | Performed by: FAMILY MEDICINE

## 2021-08-04 PROCEDURE — 99213 OFFICE O/P EST LOW 20 MIN: CPT | Performed by: FAMILY MEDICINE

## 2021-08-04 PROCEDURE — 3074F SYST BP LT 130 MM HG: CPT | Performed by: FAMILY MEDICINE

## 2021-08-04 RX ORDER — AMOXICILLIN 500 MG/1
CAPSULE ORAL
COMMUNITY
Start: 2021-04-10 | End: 2021-08-04 | Stop reason: ALTCHOICE

## 2021-08-04 RX ORDER — ACETAMINOPHEN AND CODEINE PHOSPHATE 300; 30 MG/1; MG/1
TABLET ORAL
COMMUNITY
Start: 2021-04-10 | End: 2021-08-04 | Stop reason: ALTCHOICE

## 2021-08-04 RX ORDER — GABAPENTIN 300 MG/1
300 CAPSULE ORAL NIGHTLY
COMMUNITY
Start: 2021-04-13

## 2021-08-05 NOTE — PROGRESS NOTES
CHIEF COMPLAINT: Patient presents with:  Pre-Op Exam: left carpal tunner surgery on 8/13/21 with Dr. Tru Cifuentes         HPI:     Cristina Garner is a 61year old female presents for pre-op physical.    Pt is here for a pre-op physical.     Procedure: Left carpal Medication Sig Dispense Refill   • VENLAFAXINE 150 MG Oral Capsule SR 24 Hr TAKE 1 CAPSULE(150 MG) BY MOUTH EVERY DAY 90 capsule 1   • baclofen 20 MG Oral Tab TAKE 1 TABLET BY MOUTH DAILY IN THE EVENING 90 tablet 0   • metFORMIN HCl 850 MG Oral Tab TAKE times daily.  (Patient not taking: Reported on 8/4/2021 ) 3 Bottle 3   • traZODone HCl 100 MG Oral Tab TAKE 1 TABLET BY MOUTH NIGHTLY AS NEEDED FOR SLEEP 30 tablet 11   • ALPRAZolam 0.25 MG Oral Tab May take 1 -2 tablets daily (Patient not taking: Reported range of motion and neck supple. Skin:     General: Skin is warm and dry. Neurological:      General: No focal deficit present. Mental Status: She is alert and oriented to person, place, and time.    Psychiatric:         Mood and Affect: Mood jessica 5.2*   • NON-HDL CHOLESTEROL 01/06/2021 207*   • TSH 01/06/2021 0.49    • T4, FREE 01/06/2021 1.2    • VITAMIN B12 01/06/2021 737    • VITAMIN D, 25-OH, TOTAL 01/06/2021 75       REVIEWED THIS VISIT  ASSESSMENT/PLAN:   61year old female with PMH of contro [facility])     Coronary artery disease involving native coronary artery of native heart without angina pectoris     Atypical follicular adenoma     Paresthesia of both hands     Bilateral lower extremity edema     Other fatigue     Odynophagia     Anxiety     R

## 2021-08-05 NOTE — TELEPHONE ENCOUNTER
Please fax 8/4/21 pre-op H&P along with EKG to surgeon Dr. Izaiah Haley. Please inform pt when this is done. Thanks!

## 2021-08-11 DIAGNOSIS — F41.9 ANXIETY: ICD-10-CM

## 2021-08-13 RX ORDER — CLONAZEPAM 0.5 MG/1
0.25 TABLET ORAL 2 TIMES DAILY PRN
Qty: 90 TABLET | Refills: 1 | Status: SHIPPED | OUTPATIENT
Start: 2021-08-13

## 2021-09-03 DIAGNOSIS — E89.0 POSTOPERATIVE HYPOTHYROIDISM: ICD-10-CM

## 2021-09-03 DIAGNOSIS — J81.1 CHRONIC PULMONARY EDEMA: ICD-10-CM

## 2021-09-03 RX ORDER — LEVOTHYROXINE SODIUM 112 UG/1
TABLET ORAL
Qty: 90 TABLET | Refills: 3 | Status: SHIPPED | OUTPATIENT
Start: 2021-09-03

## 2021-09-03 RX ORDER — LISINOPRIL 20 MG/1
TABLET ORAL
Qty: 90 TABLET | Refills: 3 | Status: SHIPPED | OUTPATIENT
Start: 2021-09-03

## 2021-09-03 RX ORDER — PANTOPRAZOLE SODIUM 40 MG/1
TABLET, DELAYED RELEASE ORAL
Qty: 90 TABLET | Refills: 3 | Status: SHIPPED | OUTPATIENT
Start: 2021-09-03

## 2021-09-03 NOTE — TELEPHONE ENCOUNTER
Pt requesting refill of   Requested Prescriptions     Pending Prescriptions Disp Refills   • LEVOTHYROXINE 112 MCG Oral Tab [Pharmacy Med Name: LEVOTHYROXINE 0.112MG (112MCG) TABS] 90 tablet 3     Sig: TAKE 1 TABLET(112 MCG) BY MOUTH EVERY MORNING BEFORE

## 2021-09-30 DIAGNOSIS — F41.9 ANXIETY: ICD-10-CM

## 2021-09-30 DIAGNOSIS — M54.50 BILATERAL LOW BACK PAIN WITHOUT SCIATICA, UNSPECIFIED CHRONICITY: ICD-10-CM

## 2021-10-04 NOTE — TELEPHONE ENCOUNTER
Refill no protocol available:     Pt requesting refill of   Requested Prescriptions     Pending Prescriptions Disp Refills   • BACLOFEN 20 MG Oral Tab [Pharmacy Med Name: BACLOFEN 20MG TABLETS] 90 tablet 0     Sig: TAKE 1 TABLET BY MOUTH DAILY IN THE EVENI

## 2021-10-12 RX ORDER — VENLAFAXINE HYDROCHLORIDE 75 MG/1
CAPSULE, EXTENDED RELEASE ORAL
Qty: 90 CAPSULE | Refills: 2 | OUTPATIENT
Start: 2021-10-12

## 2021-10-12 RX ORDER — BACLOFEN 20 MG/1
TABLET ORAL
Qty: 90 TABLET | Refills: 0 | OUTPATIENT
Start: 2021-10-12

## 2021-10-13 DIAGNOSIS — F33.42 RECURRENT MAJOR DEPRESSIVE DISORDER, IN FULL REMISSION (HCC): ICD-10-CM

## 2021-10-13 RX ORDER — BUPROPION HYDROCHLORIDE 300 MG/1
300 TABLET ORAL DAILY
Qty: 90 TABLET | Refills: 0 | Status: SHIPPED | OUTPATIENT
Start: 2021-10-13

## 2022-01-03 ENCOUNTER — TELEPHONE (OUTPATIENT)
Dept: FAMILY MEDICINE CLINIC | Facility: CLINIC | Age: 57
End: 2022-01-03

## 2022-01-03 DIAGNOSIS — J81.1 CHRONIC PULMONARY EDEMA: ICD-10-CM

## 2022-01-03 DIAGNOSIS — I10 ESSENTIAL HYPERTENSION: ICD-10-CM

## 2022-01-03 RX ORDER — FUROSEMIDE 20 MG/1
TABLET ORAL
Qty: 180 TABLET | Refills: 0 | Status: SHIPPED | OUTPATIENT
Start: 2022-01-03

## 2022-01-03 RX ORDER — POTASSIUM CHLORIDE 20 MEQ/1
20 TABLET, EXTENDED RELEASE ORAL DAILY
Qty: 90 TABLET | Refills: 0 | Status: SHIPPED | OUTPATIENT
Start: 2022-01-03

## 2022-01-03 NOTE — TELEPHONE ENCOUNTER
Refill Passed Protocol:     Pt requesting refill of   Requested Prescriptions     Pending Prescriptions Disp Refills   • POTASSIUM CHLORIDE 20 MEQ Oral Tab CR [Pharmacy Med Name: POTASSIUM CL 20MEQ ER TABLETS] 90 tablet 3     Sig: TAKE 1 TABLET(20 MEQ) BY

## 2022-01-04 DIAGNOSIS — G47.00 INSOMNIA, UNSPECIFIED TYPE: ICD-10-CM

## 2022-01-04 NOTE — TELEPHONE ENCOUNTER
Patient is overdue for physical, blood pressure monitoring depression etc.-overdue for labs. Last office visit was January 2021. Please call patient to schedule annual physical chronic disease management ASAP needs fasting labs.   Hypertension managemen

## 2022-01-05 RX ORDER — TRAZODONE HYDROCHLORIDE 100 MG/1
TABLET ORAL
Qty: 30 TABLET | Refills: 11 | OUTPATIENT
Start: 2022-01-05

## 2022-01-05 NOTE — TELEPHONE ENCOUNTER
Requested Prescriptions     Pending Prescriptions Disp Refills   • traZODone 100 MG Oral Tab [Pharmacy Med Name: TRAZODONE 100MG TABLETS] 30 tablet 11     Sig: TAKE 1 TABLET BY MOUTH EVERY NIGHT AS NEEDED FOR SLEEP     Refill denied, pt established care wi

## 2022-01-25 NOTE — TELEPHONE ENCOUNTER
Pt sent [employer] message 1/4/22. Closing encounter. Chelo Jacinto  to Charles Maher, DO          6:07 PM  Tariq Aguilar  I wanted to update you regarding my health in the past few months.  The end of September I had emergent gallbladder surgery with some comp

## 2022-01-26 DIAGNOSIS — F33.42 RECURRENT MAJOR DEPRESSIVE DISORDER, IN FULL REMISSION (HCC): ICD-10-CM

## 2022-01-26 DIAGNOSIS — E28.2 POLYCYSTIC OVARIES: ICD-10-CM

## 2022-01-27 RX ORDER — BUPROPION HYDROCHLORIDE 300 MG/1
TABLET ORAL
Qty: 90 TABLET | Refills: 0 | OUTPATIENT
Start: 2022-01-27

## 2022-01-27 NOTE — TELEPHONE ENCOUNTER
Requested Prescriptions     Pending Prescriptions Disp Refills   • BUPROPION 300 MG Oral Tablet 24 Hr [Pharmacy Med Name: BUPROPION XL 300MG TABLETS] 90 tablet 0     Sig: TAKE 1 TABLET(300 MG) BY MOUTH DAILY     Refill denied.  Per refill request 1/26/22 pt

## 2022-04-04 RX ORDER — FUROSEMIDE 20 MG/1
TABLET ORAL
Qty: 180 TABLET | Refills: 0 | OUTPATIENT
Start: 2022-04-04

## 2022-09-22 DIAGNOSIS — E89.0 POSTOPERATIVE HYPOTHYROIDISM: ICD-10-CM

## 2022-09-22 DIAGNOSIS — J81.1 CHRONIC PULMONARY EDEMA: ICD-10-CM

## 2022-09-22 RX ORDER — LISINOPRIL 20 MG/1
TABLET ORAL
Qty: 90 TABLET | Refills: 3 | OUTPATIENT
Start: 2022-09-22

## 2022-09-22 RX ORDER — LEVOTHYROXINE SODIUM 112 UG/1
TABLET ORAL
Qty: 90 TABLET | Refills: 3 | OUTPATIENT
Start: 2022-09-22

## 2023-02-16 DIAGNOSIS — M54.50 BILATERAL LOW BACK PAIN WITHOUT SCIATICA, UNSPECIFIED CHRONICITY: ICD-10-CM

## 2023-02-16 RX ORDER — BACLOFEN 20 MG/1
TABLET ORAL
Qty: 90 TABLET | Refills: 0 | OUTPATIENT
Start: 2023-02-16

## 2023-03-14 NOTE — TELEPHONE ENCOUNTER
From: Velia Sampson  To: oRbert Weinberg DO  Sent: 9/6/2019 12:55 PM CDT  Subject: Other    My son, Jeremie Pennington, has an appointment on Monday for dose 2 HPV. We have [employer] through [address] [place] [address] Road.  I was given 2 options for that visit - to pay cash
No

## 2024-04-26 NOTE — TELEPHONE ENCOUNTER
Pt requesting refill of   Requested Prescriptions     Pending Prescriptions Disp Refills   • Venlafaxine HCl  MG Oral Capsule SR 24 Hr 90 capsule 3     Sig: Take 1 capsule (150 mg total) by mouth once daily. No protocol available, due for appt. 62

## 2024-12-02 NOTE — ED INITIAL ASSESSMENT (HPI)
Patient presents with ac sob and audible wheezing. Sats 86%.   Has noted peripheral swelling of late Negative

## (undated) DIAGNOSIS — J81.1 CHRONIC PULMONARY EDEMA: ICD-10-CM

## (undated) NOTE — MR AVS SNAPSHOT
After Visit Summary   5/1/2017    Augustina Patidonn Hoang    MRN: [id number]           Diagnoses this Visit     Ps[facility] ([facility]    -  Primary       Allergies     Levaquin Hives    \"TABS\"      Your Vital Signs Were     Weight Last Period Smoking [facility] [place] [facility] [address] [phone] [facility]       Wednesday May 31, 2017 2:00 PM     Appointment with DREA Guardado at [facility] in [place] ([phone])   2

## (undated) NOTE — MR AVS SNAPSHOT
After Visit Summary   4/17/2017    Suzanne Stoner    MRN: [id number]           Diagnoses this Visit     Long-term use of immunosuppressant medication         Foot joint stiff, unspecified laterality         Tingling         Psoriatic arthritis ([facility]

## (undated) NOTE — LETTER
10/30/19        [facility] [address]      Dear Otilio Hoskins,    Our records indicate that you have outstanding lab work and or testing that was ordered for you and has not yet been completed:  Orders Placed This Encounter        He

## (undated) NOTE — LETTER
07/24/20        Augustina Emmanuel [id number]      Dear Michelle Rayo,    Our records indicate that you have outstanding lab work and or testing that was ordered for you and has not yet been completed:  Orders Placed This Encounter

## (undated) NOTE — MR AVS SNAPSHOT
[facility] [place]  [address] [facility] [address]  [phone]               Thank you for choosing us for your health care visit with Ingrid Becker DO. We are glad to serve you and happy to provide you with this summary of your visit.   Pl Take 1 tablet (300 mg total) by mouth once daily. Commonly known as:  WELLBUTRIN XL           CALCIUM + D OR   Take  by mouth.            folic acid 1 MG Tabs   One tab per day   Commonly known as:  FOLVITE           Iron (Ferrous Gluconate) 256 (28 Fe) M [address] [facility], [address] [place] [address], [facility] [address] [phone], [phone]  [facility], [address] [place] [address], [address] [place] [address]     Phone:  [phone]    - baclofen 20 MG Tabs  - Venlafaxine HCl  MG Cp24  - Venlafaxine HCl ER 75 MG Cp24            My

## (undated) NOTE — LETTER
Date & Time: 9/28/2018, 7:48 PM  Patient: Suleman Up  Encounter Provider(s):    Sandra Orosco MD         This certifies that I, Suleman Up, a patient at [facility], am leaving the facility voluntarily and [facility]

## (undated) NOTE — MR AVS SNAPSHOT
After Visit Summary   5/31/2017    Augustina Whalen [place]    MRN: [id number]           Diagnoses this Visit     Psoriatic arthropathy ([facility]    -  Primary       Allergies     Levaquin Hives    \"TABS\"      Your Vital Signs Were     Smoking Status Appointment with Romayne Bliss at [address] [place] [address] ([phone])   [address]       Wednesday June 28, 2017 2:00 PM     Appointment with DREA Arzola Ala at [facility] in [facility] ([phone])   [address] [address]

## (undated) NOTE — LETTER
01/19/18        Augustina Cristobal      Dear Hilda Ibarra,    Our records indicate that you have outstanding lab work and or testing that was ordered for you and has not yet been completed:              Lipid Panel       XR KNEE (1 OR 2

## (undated) NOTE — LETTER
01/18/19        Augustina Emmanuel [id number]      Dear Rossi Israel,    Our records indicate that you have outstanding lab work and or testing that was ordered for you and has not yet been completed:  Orders Placed This Encounter      CBC

## (undated) NOTE — MR AVS SNAPSHOT
After Visit Summary   3/6/2017    Aliza Mcmahan    MRN: [id number]           Diagnoses this Visit     [facility] ([facility]    -  Primary       Allergies     Levaquin Hives    \"TABS\"      Your Vital Signs Were     [facility] [address] [address]       Wednesday March 08, 2017 9:00 AM     Appointment with Kashif Miranda 25 5 at [facility] Ultrasound ([phone])   There are no eating or activity restrictions for this exam.   [address]

## (undated) NOTE — MR AVS SNAPSHOT
[facility] [place]  [address] [facility] [address] [phone]               Thank you for choosing us for your health care visit with Iker Yanez DO. We are glad to serve you and happy to provide you with this summary of your visit.   Pl Thyroid profile includes: T4, free  and TSH   Assoc Dx: Irritable bowel syndrome with diarrhea [K58.0]                 Follow-up Instructions     Return in about 1 month (around 4/6/2017) for recheck symptoms, discuss labs.          Reason for Today's Vis · Poor posture  · Stretching or moving wrong, without noticing pain at the time  · Poor coordination, lack of regular exercise (check with your doctor about this)  · Spinal disc disease or arthritis  · Stress  Pain can also be related to pregnancy, or illn applied to the painful area for 20 minutes then remove it for 20 minutes. Do this over a period of 60 to 90 minutes or several times a day. Do not sleep on a heating pad. It can lead to skin burns or tissue damage.   · You can alternate ice and heat therapy [address]757. All rights reserved. This information is not intended as a substitute for professional medical care. Always follow your healthcare professional's instructions. What is Irritable Bowel Syndrome (IBS)?      Muscle contraction moves food through nerves or muscles in your digestive tract. · There is also some evidence that certain bacteria found after a severe gastroinstestinal infection in the small intestine and colon may cause IBS.   · While stress and anxiety worsen the symptoms of IBS, it is n the working of your digestive tract. · Avoid alcohol, which can irritate your digestive tract and make your symptoms worse. · Eat more fiber if constipation is a problem. Fiber makes the stool softer and easier to pass through the colon.   Reduce stress Remember, you can always eat more, but cannot eat less once you’ve eaten too much. · High-fiber foods are complicated. While they may help relieve constipation, they can make your bloating, cramping, gas, and diarrhea worse. · Eat less sugar.   · Try cutt to change. You can call as directed for the results.   When to seek medical advice  Call your healthcare provider right away if any of these occur:  · Abdominal pain gets worse  · Constant abdominal pain moves to the right-lower abdomen  · You can't keep li Levothyroxine Sodium 125 MCG Tabs   Take 1 tablet (125 mcg total) by mouth once daily. Commonly known as:  SYNTHROID, LEVOTHROID           Lisinopril-Hydrochlorothiazide 20-25 MG Tabs   Take 1 tablet by mouth once daily.            Melatonin 3 MG Caps You can access your MyChart to more actively manage your health care and view more details from this visit by going to [email or web address].   If you've recently had a stay at the Hospital you can access your discharge instructions in [address] by alice

## (undated) NOTE — MR AVS SNAPSHOT
After Visit Summary   4/3/2017    Augustina Ambrose    MRN: [id number]           Diagnoses this Visit     Psoriatic arthropathy ([facility]    -  Primary       Allergies     Levaquin Hives    \"TABS\"      Your Vital Signs Were     BP Pulse Temp(Src) Resp inFLIXimab (REMICADE) 100 MG Intravenous Recon Soln Inject 1,000 mg into the vein. Every 4 weeks     Melatonin 3 MG Oral Cap Take  by mouth. Calcium Carbonate-Vitamin D (CALCIUM + D OR) Take  by mouth.                 Patient Instructions     None

## (undated) NOTE — LETTER
[facility] [address], [address]  Consent for Procedure/Sedation    Date: 9/30/18    Time: 8338      1.  I authorize the performance upon Augustina Davalos the following:cardiac catheterization, left ventricular cineangiography, bilat period, the physician will determine when the applicable recovery period ends for purposes of reinstating the Do Not Resuscitate (DNR) order.     Signature of Patient: ____________________________________________________    Signature of person authorized

## (undated) NOTE — MR AVS SNAPSHOT
After Visit Summary   2/6/2017    Sarai Gamez    MRN: [id number]           Diagnoses this Visit     Ps[facility] arthropathy ([facility]    -  Primary       Allergies     Levaquin Hives    \"TABS\"      Your Vital Signs Were     BP Pulse Temp(Src) Resp Weigh [email or web address]. If you've recently had a stay at the Hospital you can access your discharge instructions in [employer] by going to Visits < Admission Summaries.  If you've been to the Emergency Department or your doctor's office, you can view yo

## (undated) NOTE — LETTER
07/24/20        Augustina Emmanuel [id number]      Dear Ebonie Romero,    Our records indicate that you have outstanding lab work and or testing that was ordered for you and has not yet been completed:  Orders Placed This Encounter

## (undated) NOTE — LETTER
10/15/18        [facility] [phone]      Dear Jammie Manzano,    Our records indicate that you have outstanding lab work and or testing that was ordered for you and has not yet been completed:  Orders Placed This Encounter      CMP

## (undated) NOTE — Clinical Note
Hi, Dr. Maxime Mckeon, DO. Thank you for referring Ms. Ernesta Fleischer for rheumatologic evaluation. Please see the discussion portion of my note and let me know if you have any questions.  JEREMY Costa Rheumatology2/18/2019

## (undated) NOTE — Clinical Note
04/15/2017        Augustina Infante  [address] [place] [id number]      Dear Diana Santacruz,    Our records indicate that you have outstanding lab work and or testing that was ordered for you and has not yet been completed:      TSH and Free T4  To provide you wi

## (undated) NOTE — MR AVS SNAPSHOT
After Visit Summary   1/11/2017    Munir Sanchez    MRN: [id number]           Diagnoses this Visit     Psoriatic arthropathy ([facility]    -  Primary       Allergies     Levaquin Hives    \"TABS\"      Your Vital Signs Were     BP Pulse Temp(Src) Resp Smok Summaries. If you've been to the Emergency Department or your doctor's office, you can view your past visit information in [employer] by going to Visits < Visit Summaries. [employer] questions? Call [phone] for help.   [employer] is NOT to be used for urge

## (undated) NOTE — LETTER
5/1/2019    To Whom it My Concern: The use of Conchita Moros was denied by [facility]. It was recommended by our [employer]UDE[employer] rep to send in a request for [place] Moros together.  I have included a copy of patients insurance card, the denial letter and most recent offic

## (undated) NOTE — ED AVS SNAPSHOT
Tequila Monroe   MRN: [id number]    Department:  THE [facility] Emergency Department in [place]   Date of Visit:  3/12/2018           Disclosure     Insurance plans vary and the physician(s) referred by the ER may not be covered by your plan.  Please contact tell this physician (or your personal doctor if your instructions are to return to your personal doctor) about any new or lasting problems. The primary care or specialist physician will see patients referred from the [facility] Emergency Department.  Venkata Santos